# Patient Record
Sex: FEMALE | Race: BLACK OR AFRICAN AMERICAN | NOT HISPANIC OR LATINO | Employment: FULL TIME | URBAN - METROPOLITAN AREA
[De-identification: names, ages, dates, MRNs, and addresses within clinical notes are randomized per-mention and may not be internally consistent; named-entity substitution may affect disease eponyms.]

---

## 2019-07-17 ENCOUNTER — TELEPHONE (OUTPATIENT)
Dept: OBGYN CLINIC | Facility: HOSPITAL | Age: 59
End: 2019-07-17

## 2019-07-17 NOTE — TELEPHONE ENCOUNTER
Patient is calling to find out if we received the records that her last doctor was going to fax over  Patient is stating that they were given the fax number of [phone]  Please advise as the patient has an appt on Friday 7/19/19

## 2019-07-17 NOTE — TELEPHONE ENCOUNTER
Called patient to advise I have not seen anything in [address] or in her chart as of yet  I gave her our clinical fax number to have them re-fax to there

## 2019-07-19 ENCOUNTER — OFFICE VISIT (OUTPATIENT)
Dept: OBGYN CLINIC | Facility: CLINIC | Age: 59
End: 2019-07-19
Payer: COMMERCIAL

## 2019-07-19 ENCOUNTER — APPOINTMENT (OUTPATIENT)
Dept: RADIOLOGY | Facility: CLINIC | Age: 59
End: 2019-07-19
Payer: COMMERCIAL

## 2019-07-19 VITALS
HEIGHT: 61 IN | WEIGHT: 145 LBS | BODY MASS INDEX: 27.38 KG/M2 | DIASTOLIC BLOOD PRESSURE: 66 MMHG | SYSTOLIC BLOOD PRESSURE: 101 MMHG | HEART RATE: 91 BPM

## 2019-07-19 DIAGNOSIS — M25.511 RIGHT SHOULDER PAIN, UNSPECIFIED CHRONICITY: ICD-10-CM

## 2019-07-19 DIAGNOSIS — M75.01 ADHESIVE CAPSULITIS OF RIGHT SHOULDER: Primary | ICD-10-CM

## 2019-07-19 PROCEDURE — 73030 X-RAY EXAM OF SHOULDER: CPT

## 2019-07-19 PROCEDURE — 99203 OFFICE O/P NEW LOW 30 MIN: CPT | Performed by: ORTHOPAEDIC SURGERY

## 2019-07-19 PROCEDURE — 20610 DRAIN/INJ JOINT/BURSA W/O US: CPT | Performed by: ORTHOPAEDIC SURGERY

## 2019-07-19 RX ORDER — LIDOCAINE HYDROCHLORIDE 20 MG/ML
4 INJECTION, SOLUTION INFILTRATION; PERINEURAL
Status: COMPLETED | OUTPATIENT
Start: 2019-07-19 | End: 2019-07-19

## 2019-07-19 RX ORDER — IBUPROFEN 200 MG
1 CAPSULE ORAL DAILY
COMMUNITY
End: 2019-09-18

## 2019-07-19 RX ORDER — DEXAMETHASONE SODIUM PHOSPHATE 100 MG/10ML
40 INJECTION INTRAMUSCULAR; INTRAVENOUS
Status: COMPLETED | OUTPATIENT
Start: 2019-07-19 | End: 2019-07-19

## 2019-07-19 RX ORDER — MULTIVITAMIN
1 TABLET ORAL DAILY
COMMUNITY

## 2019-07-19 RX ORDER — LOSARTAN POTASSIUM AND HYDROCHLOROTHIAZIDE 25; 100 MG/1; MG/1
TABLET ORAL
COMMUNITY
Start: 2019-07-15

## 2019-07-19 RX ADMIN — DEXAMETHASONE SODIUM PHOSPHATE 40 MG: 100 INJECTION INTRAMUSCULAR; INTRAVENOUS at 15:58

## 2019-07-19 RX ADMIN — LIDOCAINE HYDROCHLORIDE 4 ML: 20 INJECTION, SOLUTION INFILTRATION; PERINEURAL at 15:58

## 2019-07-19 NOTE — PROGRESS NOTES
Assessment/Plan:  1  Right shoulder pain, unspecified chronicity  XR shoulder 2+ vw right    Ambulatory referral to Physical Therapy       Scribe Attestation    I,:   Stephany Arreguin MD am acting as a scribe while in the presence of the attending physician :        I,:   Pio Sánchez MD personally performed the services described in this documentation    as scribed in my presence :            58 y/o F with chronic shoulder pain, likely adhesive capsulitis  As steroid injections worked in the past, offered another steroid injection along with referral for physical therapy  Explained risks and benefits  Patient was agreeable to plan  Recommended to f/u in 6 weeks, if pain has persisted with above mentioned interventions will consider further work up such as MRI  Subjective:   Abelardo Leone is a 59 y o  female who presents to the office today for right shoulder pain  Patient brought in records from when she saw Dr Kristi Lozada for same problem  She was diagnosed with adhesive capsulitis and bicipital tendinitis  Received steroid injection in 6/29/17 and 1/23/18  Reports injections provided alleviation of pain for a few months until medication was wearing off  States pain is constant but severity of pain varies throughout the day, related to specific movements  Also reports she has pain in her shoulder while sleeping  Review of Systems   Eyes: Positive for pain, redness and visual disturbance  Musculoskeletal: Positive for myalgias  Skin: Positive for wound           Past Medical History:   Diagnosis Date    Hypertension        Past Surgical History:   Procedure Laterality Date    GASTRIC BYPASS  2010    SHOULDER SURGERY Left 2011       Family History   Problem Relation Age of Onset    Lung cancer Mother     Hypertension Mother     Hypertension Father        Social History     Occupational History    Not on file   Tobacco Use    Smoking status: Never Smoker    Smokeless tobacco: Never Used   Substance and Sexual Activity    Alcohol use: Yes     Comment: Socially    Drug use: Never    Sexual activity: Not on file         Current Outpatient Medications:     calcium citrate (CALCITRATE) 950 MG tablet, Take 1 tablet by mouth daily, Disp: , Rfl:     Cholecalciferol (VITAMIN D PO), Take by mouth, Disp: , Rfl:     losartan-hydrochlorothiazide (HYZAAR) 100-25 MG per tablet, , Disp: , Rfl:     Multiple Vitamin (MULTIVITAMIN) tablet, Take 1 tablet by mouth daily, Disp: , Rfl:     Allergies   Allergen Reactions    Penicillins        Objective:  Vitals:    07/19/19 1455   BP: 101/66   Pulse: 91       Right Shoulder Exam     Tenderness   The patient is experiencing no tenderness  Range of Motion   Extension: 10   External rotation: 30   Forward flexion: 90     Muscle Strength   Supraspinatus: 5/5   Subscapularis: 5/5   Biceps: 5/5     Other   Erythema: absent  Scars: absent  Sensation: normal    Comments:  Positive speed's and empty can test            Physical Exam   Constitutional: She is oriented to person, place, and time  She appears well-developed and well-nourished  No distress  HENT:   Head: Normocephalic and atraumatic  Eyes: Conjunctivae and EOM are normal    Neck: Normal range of motion  Cardiovascular: Intact distal pulses  Pulmonary/Chest: Effort normal    Musculoskeletal: She exhibits no edema or tenderness  As described in Ortho exam   Neurological: She is alert and oriented to person, place, and time  Vitals reviewed        I have personally reviewed pertinent films in PACS and my interpretation is as follows:  No degenerative changes or acute fractures

## 2019-07-19 NOTE — PROGRESS NOTES
Large joint arthrocentesis: R subcoracoid bursa  Date/Time: 7/19/2019 3:58 PM  Consent given by: patient  Site marked: site marked  Timeout: Immediately prior to procedure a time out was called to verify the correct patient, procedure, equipment, support staff and site/side marked as required   Supporting Documentation  Indications: pain   Procedure Details  Location: shoulder - R subcoracoid bursa  Preparation: Patient was prepped and draped in the usual sterile fashion  Needle size: 22 G  Ultrasound guidance: no  Approach: anterior  Medications administered: 4 mL lidocaine 2 %; 40 mg dexamethasone 100 mg/10 mL    Patient tolerance: patient tolerated the procedure well with no immediate complications

## 2019-07-29 ENCOUNTER — EVALUATION (OUTPATIENT)
Dept: PHYSICAL THERAPY | Facility: CLINIC | Age: 59
End: 2019-07-29
Payer: COMMERCIAL

## 2019-07-29 DIAGNOSIS — M75.01 ADHESIVE CAPSULITIS OF RIGHT SHOULDER: ICD-10-CM

## 2019-07-29 PROCEDURE — 97161 PT EVAL LOW COMPLEX 20 MIN: CPT | Performed by: PHYSICAL THERAPIST

## 2019-07-29 NOTE — PROGRESS NOTES
PT Evaluation     Today's date: 2019  Patient name: Carol Ba  : 1960  MRN: [id number]  Referring provider: Karen Hendrickson,*  Dx:   Encounter Diagnosis     ICD-10-CM    1  Adhesive capsulitis of right shoulder M75 01 Ambulatory referral to Physical Therapy                  Assessment  Assessment details: Jesusita Mendoza with signs and symptoms consistent with Adhesive capsulitis of right shoulder, with loss of range of motion, strength and spinal stabilization  Presents with high reactivity  Carol Ba would benefit with physical therapy to address these impairments to return to prior level of function  Impairments: abnormal or restricted ROM, activity intolerance, impaired physical strength, lacks appropriate home exercise program, pain with function and poor posture   Understanding of Dx/Px/POC: good   Prognosis: fair    Goals  STG  Initiate HEP  Able to sleep throughout night without pain in 3 weeks  LTG  Independent with HEP  Able to reach overhead without pain in 6 weeks  FOTO > 68 in 6 weeks    Plan  Planned therapy interventions: manual therapy, joint mobilization, neuromuscular re-education, patient education, postural training, strengthening, stretching, therapeutic exercise and home exercise program  Frequency: 2x week  Duration in visits: 12  Duration in weeks: 6  Treatment plan discussed with: patient        Subjective Evaluation    History of Present Illness  Mechanism of injury: Patient reports shoulder right shoulder pain that began a few months ago  Patient reports experiencing left frozen shoulder 8 years ago that required an ZARA  She denies any injury to the right shoulder  She had an injection one week ago by Dr Radha Talley to the right shoulder            Recurrent probem    Quality of life: good    Pain  Current pain ratin  At best pain ratin  At worst pain ratin  Location: Anterior right shoulder  Quality: dull ache  Relieving factors: change in position  Aggravating factors: overhead activity  Progression: worsening    Social Support  Steps to enter house: yes  Stairs in house: yes     Hand dominance: right    Treatments  Current treatment: physical therapy  Patient Goals  Patient goals for therapy: decreased pain, increased motion, increased strength and independence with ADLs/IADLs          Objective     Static Posture     Head  Forward  Shoulders  Rounded      Active Range of Motion   Left Shoulder   Flexion: 170 degrees   Abduction: 170 degrees   External rotation 0°: 80 degrees   Internal rotation 0°: 60 degrees     Right Shoulder   Flexion: 80 degrees with pain  Abduction: 70 degrees with pain  External rotation 0°: 15 degrees with pain  Internal rotation 0°: 30 degrees with pain    Strength/Myotome Testing     Left Shoulder     Planes of Motion   Flexion: 5   Abduction: 5   External rotation at 0°: 5   Internal rotation at 0°: 5     Right Shoulder     Planes of Motion   Flexion: 2   Abduction: 2   External rotation at 0°: 2   Internal rotation at 0°: 2       Flowsheet Rows      Most Recent Value   PT/OT G-Codes   Current Score  48   Projected Score  68   FOTO information reviewed  Yes   G code set  Carrying, Moving & Handling Objects             Precautions: HTN    Manual                                                                                   Exercise Diary                                                                                                                                                                                                                                                                                      Modalities

## 2019-08-06 ENCOUNTER — OFFICE VISIT (OUTPATIENT)
Dept: PHYSICAL THERAPY | Facility: CLINIC | Age: 59
End: 2019-08-06
Payer: COMMERCIAL

## 2019-08-06 DIAGNOSIS — M75.01 ADHESIVE CAPSULITIS OF RIGHT SHOULDER: Primary | ICD-10-CM

## 2019-08-06 PROCEDURE — 97110 THERAPEUTIC EXERCISES: CPT

## 2019-08-06 PROCEDURE — 97140 MANUAL THERAPY 1/> REGIONS: CPT

## 2019-08-06 NOTE — PROGRESS NOTES
Daily Note     Today's date: 2019  Patient name: Ronal Wallace  : 1960  MRN: [id number]  Referring provider: Tara Rios,*  Dx:   Encounter Diagnosis     ICD-10-CM    1  Adhesive capsulitis of right shoulder M75 01                   Subjective: My shoulder is sore & it feels very tired  Objective: See treatment diary below      Assessment: Tolerated treatment fair  Patient demonstrated fatigue post treatment   R shoulder PROM in supine flexion 90 degrees ; ER 45 degrees  (with end range pain)      Plan: Progress treatment as tolerated         Precautions: HTN    Manual              R shoulder flexion/ER PROM  As tolerated           R ST mobs  L S/L                                                      Exercise Diary    EVAL            UE FIS w/pb  [id number]           Pulleys   [id number]           TB rows   Red x20 reps           R IR/ER walk outs  Red x10 reps 5 sec hold           scap retractions supine  10x 5sec hold                                                                                                                                                                                                                  Modalities              CP R shoulder supine  5min

## 2019-08-12 ENCOUNTER — OFFICE VISIT (OUTPATIENT)
Dept: PHYSICAL THERAPY | Facility: CLINIC | Age: 59
End: 2019-08-12
Payer: COMMERCIAL

## 2019-08-12 DIAGNOSIS — M75.01 ADHESIVE CAPSULITIS OF RIGHT SHOULDER: Primary | ICD-10-CM

## 2019-08-12 PROCEDURE — 97110 THERAPEUTIC EXERCISES: CPT | Performed by: PHYSICAL THERAPIST

## 2019-08-12 PROCEDURE — 97140 MANUAL THERAPY 1/> REGIONS: CPT | Performed by: PHYSICAL THERAPIST

## 2019-08-12 PROCEDURE — 97112 NEUROMUSCULAR REEDUCATION: CPT | Performed by: PHYSICAL THERAPIST

## 2019-08-12 NOTE — PROGRESS NOTES
Daily Note     Today's date: 2019  Patient name: Mare Alberto  : 1960  MRN: [id number]  Referring provider: Kiko Xiong,*  Dx:   Encounter Diagnosis     ICD-10-CM    1  Adhesive capsulitis of right shoulder M75 01                   Subjective: I can move alittle farther  Objective: See treatment diary below      Assessment: Tolerated treatment well  Patient demonstrated fatigue post treatment and exhibited good technique with therapeutic exercises  Pre-tx ROM Flex 0-100, post-Tx Rom flex 0-120  Plan: Continue per plan of care  Precautions: HTN    Manual             R shoulder flexion/ER PROM  As tolerated perf          R ST mobs  L S/L           Jt mob inf glide neutral   Gr4 10x          Jt mob lat distract   Gr 4 10x                           Exercise Diary    EVAL           UE FIS w/pb  [id number]           Pulleys   [id number] 10x10          TB rows   Red x20 reps           R IR/ER walk outs  Red x10 reps 5 sec hold           scap retractions supine  10x 5sec hold redcord  supine          PNF Sh clock   perf            Punchouts in supine   20x          Wall slides   20x          Self ADD stretch   10x          Nustep    10 min L1                                                                                                                                                Modalities              CP R shoulder supine  5min

## 2019-08-13 ENCOUNTER — APPOINTMENT (OUTPATIENT)
Dept: PHYSICAL THERAPY | Facility: CLINIC | Age: 59
End: 2019-08-13
Payer: COMMERCIAL

## 2019-08-14 ENCOUNTER — OFFICE VISIT (OUTPATIENT)
Dept: PHYSICAL THERAPY | Facility: CLINIC | Age: 59
End: 2019-08-14
Payer: COMMERCIAL

## 2019-08-14 DIAGNOSIS — M75.01 ADHESIVE CAPSULITIS OF RIGHT SHOULDER: Primary | ICD-10-CM

## 2019-08-14 PROCEDURE — 97110 THERAPEUTIC EXERCISES: CPT

## 2019-08-14 PROCEDURE — 97112 NEUROMUSCULAR REEDUCATION: CPT

## 2019-08-14 NOTE — PROGRESS NOTES
Daily Note      Today's date: 2019  Patient name: Ramirez Chi  : 1960  MRN: [id number]  Referring provider: Lea Maxwell,*  Dx:   Encounter Diagnosis     ICD-10-CM    1  Adhesive capsulitis of right shoulder M75 01        Subjective: Pt reports "It hurts today"  Pt reports overall improvement  Objective: See treatment diary below    Assessment: Pt tolerated treatment well  Pt would benefit from continued PT  Pt demo 140 degrees of shld flex this session  Plan: Continue per plan of care  Precautions: HTN    Manual       R shoulder flexion/ER PROM  As tolerated perf Performed  All directions    R ST mobs  L S/L      Jt mob inf glide neutral   Gr4 10x     Jt mob lat distract   Gr 4 10x                 Exercise Diary    EVAL     Nustep   10 min L1 10'  L1            UE FIS w/pb  [id number]  20x5s    Pulleys   [id number] 10x10 10x10s    TB rows   Red x20 reps  20x  red    R IR/ER walk outs  Red x10 reps 5 sec hold  10x (5s)    scap retractions supine  10x 5sec hold redcord  supine     PNF Sh clock   perf       Punchouts in supine   20x --    Wall slides   20x 20x    Self ADD stretch   10x --    IR str     With pillow case 10x5s hold    OH shld flex str    With cane 20x                                                                                Modalities       CP R shoulder supine  5min

## 2019-08-19 ENCOUNTER — OFFICE VISIT (OUTPATIENT)
Dept: PHYSICAL THERAPY | Facility: CLINIC | Age: 59
End: 2019-08-19
Payer: COMMERCIAL

## 2019-08-19 DIAGNOSIS — M75.01 ADHESIVE CAPSULITIS OF RIGHT SHOULDER: Primary | ICD-10-CM

## 2019-08-19 PROCEDURE — 97140 MANUAL THERAPY 1/> REGIONS: CPT | Performed by: PHYSICAL THERAPIST

## 2019-08-19 PROCEDURE — 97110 THERAPEUTIC EXERCISES: CPT | Performed by: PHYSICAL THERAPIST

## 2019-08-19 NOTE — PROGRESS NOTES
Daily Note     Today's date: 2019  Patient name: Honey Mathew  : 1960  MRN: [id number]  Referring provider: Mardella Cogan,*  Dx:   Encounter Diagnosis     ICD-10-CM    1  Adhesive capsulitis of right shoulder M75 01                   Subjective: My shoulder is still pianful        Objective: See treatment diary below      Assessment: Tolerated treatment well  Patient demonstrated fatigue post treatment and exhibited good technique with therapeutic exercises      Plan: Continue per plan of care  Precautions: HTN    Manual            R shoulder flexion/ER PROM  As tolerated perf perf         R ST mobs  L S/L           Jt mob inf glide neutral   Gr4 10x Gr4 10x         Jt mob lat distract   Gr 4 10x Gr4 10x                          Exercise Diary    EVAL          UE FIS w/pb  [id number]  20x         Pulleys   [id number] 10x10 20x         TB rows   Red x20 reps  Gr 20x         R IR/ER walk outs  Red x10 reps 5 sec hold           scap retractions supine  10x 5sec hold redcord  supine          PNF Sh clock   perf            Punchouts in supine   20x          Wall slides   20x          Self ADD stretch   10x          Nustep    10 min L1          Neurac sh flex in kneeling    2x5         Neurac scap retract w depression ist    2x5         Cane ER, EXT, Flex    3x10                                                                                                        Modalities              CP R shoulder supine  5min

## 2019-08-21 ENCOUNTER — OFFICE VISIT (OUTPATIENT)
Dept: PHYSICAL THERAPY | Facility: CLINIC | Age: 59
End: 2019-08-21
Payer: COMMERCIAL

## 2019-08-21 DIAGNOSIS — M75.01 ADHESIVE CAPSULITIS OF RIGHT SHOULDER: Primary | ICD-10-CM

## 2019-08-21 PROCEDURE — 97112 NEUROMUSCULAR REEDUCATION: CPT | Performed by: PHYSICAL THERAPIST

## 2019-08-21 PROCEDURE — 97110 THERAPEUTIC EXERCISES: CPT | Performed by: PHYSICAL THERAPIST

## 2019-08-21 NOTE — PROGRESS NOTES
Daily Note     Today's date: 2019  Patient name: Xin Reilly  : 1960  MRN: [id number]  Referring provider: Aldo Fuentes,*  Dx:   Encounter Diagnosis     ICD-10-CM    1  Adhesive capsulitis of right shoulder M75 01                   Subjective: There is no improvement of pain, but I have better ROM  Objective: See treatment diary below      Assessment: Tolerated treatment well  Patient demonstrated fatigue post treatment and exhibited good technique with therapeutic exercises  Pre-tx  ABD 80 degrees,  Post-tx ABD 95 degrees  Plan: Continue per plan of care  Precautions: HTN    Manual            R shoulder flexion/ER PROM  As tolerated perf perf         R ST mobs  L S/L           Jt mob inf glide neutral   Gr4 10x Gr4 10x         Jt mob lat distract   Gr 4 10x Gr4 10x                          Exercise Diary    EVAL         UE FIS w/pb  [id number]  20x 20x        Pulleys   [id number] 10x10 20x 20x        TB rows   Red x20 reps  Gr 20x         R IR/ER walk outs  Red x10 reps 5 sec hold           scap retractions supine  10x 5sec hold redcord  supine          PNF Sh clock   perf            Punchouts in supine   20x          Wall slides   20x          Self ADD stretch   10x          Nustep    10 min L1          Neurac sh flex in kneeling    2x5 2x5        Neurac scap retract w depression ist    2x5 2x5        Cane ER, EXT, Flex    3x10         Neurac supine pelvic lift     2x5        Neurac supine bridge     2x5        Neurac SDLY Hip ABD     2x5        Neurac SDLY Hip ADD     2x5        Neurac scapular retract supine     2x5        Neurac sh ER supine     2x5                         Modalities              CP R shoulder supine  5min

## 2019-08-26 ENCOUNTER — OFFICE VISIT (OUTPATIENT)
Dept: PHYSICAL THERAPY | Facility: CLINIC | Age: 59
End: 2019-08-26
Payer: COMMERCIAL

## 2019-08-26 DIAGNOSIS — M75.01 ADHESIVE CAPSULITIS OF RIGHT SHOULDER: Primary | ICD-10-CM

## 2019-08-26 PROCEDURE — 97110 THERAPEUTIC EXERCISES: CPT

## 2019-08-26 PROCEDURE — 97140 MANUAL THERAPY 1/> REGIONS: CPT

## 2019-08-26 NOTE — PROGRESS NOTES
Daily Note     Today's date: 2019  Patient name: Becky Verdugo  : 1960  MRN: [id number]  Referring provider: Tonio Stoddard,*  Dx:   Encounter Diagnosis     ICD-10-CM    1  Adhesive capsulitis of right shoulder M75 01                   Subjective: Patient reports 8/10 R shoulder pain  Objective: See treatment diary below      Assessment: Tolerated treatment fair  Patient would benefit from continued PT   R shoulder AAROM (supine) flexion 120 degrees; ER 45 degrees ( +end range pain)      Plan: Progress treatment as tolerated  Precautions: HTN    Manual           R shoulder flexion/ER PROM  As tolerated perf perf performed        R ST mobs  L S/L   performed        Jt mob inf glide neutral   Gr4 10x Gr4 10x ----        Jt mob lat distract   Gr 4 10x Gr4 10x performed                         Exercise Diary    EVAL        UE FIS w/pb  [id number]  20x 20x 20x       Pulleys   [id number] 10x10 20x 20x [id number]       TB rows   Red x20 reps  Gr 20x  Gr x20       R IR/ER walk outs  Red x10 reps 5 sec hold    Red x10        scap retractions supine  10x 5sec hold redcord  supine   supine 2x10       PNF Sh clock   perf            Punchouts in supine   20x          Wall slides   20x   hep       Self ADD stretch   10x   hep       Nustep    10 min L1   ---       Neurac sh flex in kneeling    2x5 2x5        Neurac scap retract w depression ist    2x5 2x5        Cane ER, EXT, Flex    3x10  ER/flexion x10 reps        Neurac supine pelvic lift     2x5        Neurac supine bridge     2x5        Neurac SDLY Hip ABD     2x5        Neurac SDLY Hip ADD     2x5        Neurac scapular retract supine     2x5        Neurac sh ER supine     2x5                         Modalities              CP R shoulder supine  5min

## 2019-08-28 ENCOUNTER — OFFICE VISIT (OUTPATIENT)
Dept: PHYSICAL THERAPY | Facility: CLINIC | Age: 59
End: 2019-08-28
Payer: COMMERCIAL

## 2019-08-28 DIAGNOSIS — M75.01 ADHESIVE CAPSULITIS OF RIGHT SHOULDER: Primary | ICD-10-CM

## 2019-08-28 PROCEDURE — 97140 MANUAL THERAPY 1/> REGIONS: CPT

## 2019-08-28 PROCEDURE — 97112 NEUROMUSCULAR REEDUCATION: CPT

## 2019-08-28 NOTE — PROGRESS NOTES
PT Re-Evaluation     Today's date: 2019  Patient name: Honey Mathew  : 1960  MRN: [id number]  Referring provider: Mardella Cogan,*  Dx:   Encounter Diagnosis     ICD-10-CM    1  Adhesive capsulitis of right shoulder M75 01                   Assessment  Assessment details: Honey Mathew has been seen in skilled outpatient physical therapy for 8 sessions and has shown limited progress toward short term and long term goals  Pt presents with improved postural awareness and increased A/PROM of R shoulder  Pt presents with complaints of increased pain intensity and has shown limited gains in strength  Pt continues to present with the following impairments: pain, decreased UE range of motion, impaired function and fair posture  Due to these impairments, pt continues to have difficulty performing the following activities without pain: ADL's, recreational activities, lifting/carrying, reaching overhead, putting on/taking off shirt/sweater, reaching behind the back, turning/twisting arms, household chores, yard work  Pt will benefit from continued skilled physical therapy in order to address the aforementioned deficits and functional limitations and to progress toward prior level of function       Understanding of Dx/Px/POC: good   Prognosis: fair    Goals  STG  [Goal Met] Initiate HEP  [20% Met] Able to sleep throughout night without pain in 3 weeks  LTG  [Goal Met] Independent with HEP  [25% Met] Able to reach overhead without pain in 6 weeks  [0% Met] FOTO > 68 in 6 weeks    Plan  Planned therapy interventions: manual therapy, joint mobilization, neuromuscular re-education, patient education, postural training, strengthening, stretching, therapeutic exercise and home exercise program  Frequency: 2x week  Duration in visits: 12  Duration in weeks: 6  Treatment plan discussed with: patient        Subjective Evaluation    History of Present Illness  Mechanism of injury: Patient reports shoulder right shoulder pain that began a few months ago  Patient reports experiencing left frozen shoulder 8 years ago that required an ZARA  She denies any injury to the right shoulder  She had an injection one week ago by Dr Adrian Panda to the right shoulder     (19) Pt reports limited progress since starting skilled PT  Pt states that range of motion has improved, but pain has increased since starting PT  Pt states that the pain sometimes intensifies at rest  Pt also states that strength is likely the same or slightly worse  Pt states that reaching overhead/behind the back, lifting/carrying, self-care, putting on/taking off shirt/jacket and household chores are still difficult  Pt states she will follow up with Dr Adrian Panda on Friday,   Recurrent probem    Quality of life: good    Pain  Current pain ratin  At best pain ratin  At worst pain rating: 10  Location: Anterior right shoulder  Quality: dull ache  Relieving factors: change in position  Aggravating factors: overhead activity  Progression: worsening    Social Support  Steps to enter house: yes  Stairs in house: yes     Hand dominance: right    Treatments  Current treatment: physical therapy  Patient Goals  Patient goals for therapy: decreased pain, increased motion, increased strength and independence with ADLs/IADLs        Objective     Static Posture     Head  Forward  Shoulders  Rounded      Active Range of Motion   Left Shoulder   Flexion: 170 degrees   Abduction: 170 degrees   External rotation 0°: 80 degrees   Internal rotation 0°: 60 degrees     Right Shoulder   Flexion: 120 degrees with pain  Abduction: 90 degrees with pain  External rotation 0°: 45 degrees with pain  Internal rotation 0°: 50 degrees with pain    Passive Range of Motion  Right Shoulder  Flexion: 155 degrees with pain  Abduction: 95 degrees with empty end feel due to pain  External rotation 0°: 55 degrees with empty end feel due to pain   Internal rotation 0°: 55 degrees with pain    Strength/Myotome Testing     Left Shoulder     Planes of Motion   Flexion: 5   Abduction: 5   External rotation at 0°: 5   Internal rotation at 0°: 5     Right Shoulder     Planes of Motion   Flexion: 2+  Abduction: 2   External rotation at 0°: 2   Internal rotation at 0°: 2            Precautions: HTN    Manual  8/26 8/28      R shoulder flexion/ER PROM performed Performed      R ST mobs performed Performed      Jt mob inf glide neutral ---- --      Jt mob lat distract performed Performed                  Exercise Diary  8/26 8/28      UE FIS w/pb 20x 20x      Pulleys  [id number] 15x (5s)      TB rows  Gr x20 20x  green      R IR/ER walk outs Red x10  --      scap retractions supine supine 2x10 2x10      PNF Sh clock        Punchouts in supine        Wall slides hep       Self ADD stretch hep       Nustep  ---       Neurac sh flex in kneeling        Neurac scap retract w depression ist        Cane ER, EXT, Flex ER/flexion x10 reps  10x  Flex/ER      Neurac supine pelvic lift        Neurac supine bridge        Neurac SDLY Hip ABD        Neurac SDLY Hip ADD        Neurac scapular retract supine        Neurac sh ER supine                    Modalities         CP R shoulder supine

## 2019-08-30 ENCOUNTER — OFFICE VISIT (OUTPATIENT)
Dept: OBGYN CLINIC | Facility: CLINIC | Age: 59
End: 2019-08-30
Payer: COMMERCIAL

## 2019-08-30 VITALS
DIASTOLIC BLOOD PRESSURE: 84 MMHG | SYSTOLIC BLOOD PRESSURE: 126 MMHG | HEIGHT: 61 IN | HEART RATE: 88 BPM | WEIGHT: 135.4 LBS | BODY MASS INDEX: 25.57 KG/M2

## 2019-08-30 DIAGNOSIS — M75.01 ADHESIVE CAPSULITIS OF RIGHT SHOULDER: Primary | ICD-10-CM

## 2019-08-30 PROCEDURE — 99213 OFFICE O/P EST LOW 20 MIN: CPT | Performed by: ORTHOPAEDIC SURGERY

## 2019-08-30 NOTE — PROGRESS NOTES
Assessment/Plan:  1  Adhesive capsulitis of right shoulder  MRI shoulder right wo contrast       Scribe Attestation    I,:   Chu Rivera MA am acting as a scribe while in the presence of the attending physician :        I,:   Jennie Calzada MD personally performed the services described in this documentation    as scribed in my presence :            Winthrop Hatchet and I engaged in a discussion today in the office in regard to her shoulder pain  Based on her clinical exam today it is clear that she continues to suffer from adhesive capsulitis of her right shoulder  Because she continues to have limited range of motion along with significant pain I will order an MRI to further evaluate her rotator cuff  I am suspicious for rotator cuff tear  I would like to see her back in office after testing to further discuss her treatment options  Subjective:   Marychuy Peña is a 59 y o  female who presents to the office today for a follow-up evaluation of her right shoulder  Patient was previously referred to physical therapy to address her adhesive capsulitis  She states she has been compliant in attending physical therapy and has been slowly improving with her range of motion  Patient states she has had a large increase in her pain  Patient notes no new incident of injury  Patient describes the pain as constant that radiates up to her neck and to the distal deltoid area  Pain at times wakes her from sleep  She states she still has trouble reaching behind her and often has to use the left arm to dress herself  She denies any numbness tingling today  Review of Systems   Constitutional: Negative for chills, fever and unexpected weight change  HENT: Negative for hearing loss, nosebleeds and sore throat  Eyes: Negative for pain, redness and visual disturbance  Respiratory: Negative for cough, shortness of breath and wheezing  Cardiovascular: Negative for chest pain, palpitations and leg swelling  Gastrointestinal: Negative for abdominal pain, nausea and vomiting  Endocrine: Negative for polydipsia and polyuria  Genitourinary: Negative for dyspareunia and hematuria  Musculoskeletal: Positive for arthralgias and myalgias  Negative for joint swelling  Skin: Negative for rash and wound  Neurological: Negative for dizziness, numbness and headaches  Psychiatric/Behavioral: Negative for decreased concentration and suicidal ideas  The patient is not nervous/anxious  Past Medical History:   Diagnosis Date    Hypertension        Past Surgical History:   Procedure Laterality Date    GASTRIC BYPASS  2010    SHOULDER SURGERY Left 2011       Family History   Problem Relation Age of Onset    Lung cancer Mother     Hypertension Mother     Hypertension Father        Social History     Occupational History    Not on file   Tobacco Use    Smoking status: Never Smoker    Smokeless tobacco: Never Used   Substance and Sexual Activity    Alcohol use: Yes     Comment: Socially    Drug use: Never    Sexual activity: Not on file         Current Outpatient Medications:     calcium citrate (CALCITRATE) 950 MG tablet, Take 1 tablet by mouth daily, Disp: , Rfl:     Cholecalciferol (VITAMIN D PO), Take by mouth, Disp: , Rfl:     losartan-hydrochlorothiazide (HYZAAR) 100-25 MG per tablet, , Disp: , Rfl:     Multiple Vitamin (MULTIVITAMIN) tablet, Take 1 tablet by mouth daily, Disp: , Rfl:     Allergies   Allergen Reactions    Penicillins        Objective:  Vitals:    08/30/19 1507   BP: 126/84   Pulse: 88       Right Shoulder Exam     Range of Motion   External rotation: 20   Right shoulder internal rotation 0 degrees: 0  Muscle Strength   Abduction: 4/5   Internal rotation: 5/5   External rotation: 5/5     Other   Erythema: absent  Scars: absent  Sensation: normal  Pulse: present            Physical Exam   Constitutional: She is oriented to person, place, and time   She appears well-developed and well-nourished  HENT:   Head: Normocephalic and atraumatic  Eyes: Pupils are equal, round, and reactive to light  Conjunctivae are normal    Neck: Normal range of motion  Neck supple  Cardiovascular: Normal rate and intact distal pulses  Pulmonary/Chest: Effort normal  No respiratory distress  Musculoskeletal:   As noted in HPI   Neurological: She is alert and oriented to person, place, and time  Skin: Skin is warm and dry  Psychiatric: She has a normal mood and affect  Her behavior is normal    Vitals reviewed  I have personally reviewed pertinent films in PACS and my interpretation is as follows:   No new images to review

## 2019-09-11 ENCOUNTER — OFFICE VISIT (OUTPATIENT)
Dept: OBGYN CLINIC | Facility: CLINIC | Age: 59
End: 2019-09-11
Payer: COMMERCIAL

## 2019-09-11 VITALS
WEIGHT: 135.6 LBS | BODY MASS INDEX: 25.6 KG/M2 | HEART RATE: 67 BPM | HEIGHT: 61 IN | DIASTOLIC BLOOD PRESSURE: 64 MMHG | SYSTOLIC BLOOD PRESSURE: 99 MMHG

## 2019-09-11 DIAGNOSIS — M75.01 ADHESIVE CAPSULITIS OF RIGHT SHOULDER: Primary | ICD-10-CM

## 2019-09-11 PROCEDURE — 99214 OFFICE O/P EST MOD 30 MIN: CPT | Performed by: ORTHOPAEDIC SURGERY

## 2019-09-11 NOTE — PROGRESS NOTES
Assessment/Plan:  1  Adhesive capsulitis of right shoulder         Scribe Attestation    I,:   Tia Garrido am acting as a scribe while in the presence of the attending physician :        I,:   Simba Seymour MD personally performed the services described in this documentation    as scribed in my presence :            Thalia upon examination and review the MRI report of the right shoulder does demonstrate symptoms consistent with adhesive capsulitis  The MRI report does note a very small interstitial tear of the infraspinatus however no tears to the supraspinatus  There is thickening of the joint capsule demonstrated into the axillary recess with surrounding edema, and fusion  She does demonstrate significant restrictions in range of motion actively as well as passively  She is unable to internally or externally rotate her shoulder passively on exam today  And demonstrates approximately 50° of passive abduction  She is weak into abduction due to pain however demonstrates good strength with internal and external rotation  I did note to [facility] that based off her clinical examination today as well as the MRI report the right shoulder she would be a candidate for right shoulder manipulation under anesthesia  I do not recommend a course of oral medications as she is unable to take NSAIDs and do not recommend corticosteroids as I do have concern for gastric and renal damage  I did discuss the procedure and associated risks including but not limited to bleeding, fracture, recurrence of painful symptoms, increased stiffness, increased pain, soft tissue injury, and need for further procedures  I did note to her that she may return to work and driving as she removed gains function into her arm after her nerve block  [facility] did verbalize understanding to the aforementioned information had no further questions    She will meet my surgical scheduler prior to leaving the office today set up a date and time as per my schedule  I will see Winthrop Hatchet back on the date of her procedure  Subjective:   Marychuy Peña is a 59 y o  female who presents to the office today for follow-up evaluation of her right shoulder  We have been treating her conservatively with physical therapy for adhesive capsulitis  Unfortunately she failed to have any significant improvements with range of motion or pain and still continues to have significant restrictions range of motion  She states that her painful symptoms are described as a moderate to severe sharp pain about the superior anterior aspect of her shoulder  She states it is worse with reaching activities and is better while at rest   She states that she will experience intermittent painful symptoms while at rest as well  She denies experiencing any numbness or tingling sensations  She did have an MRI of her right shoulder completed today unfortunately was unable to bring the images  However we do have access to the report  Review of Systems   Constitutional: Negative for chills, fever and unexpected weight change  HENT: Negative for hearing loss, nosebleeds and sore throat  Eyes: Negative for pain, redness and visual disturbance  Respiratory: Negative for cough, shortness of breath and wheezing  Cardiovascular: Negative for chest pain, palpitations and leg swelling  Gastrointestinal: Negative for abdominal pain, nausea and vomiting  Endocrine: Negative for polydipsia and polyuria  Genitourinary: Negative for dysuria and hematuria  Musculoskeletal:        See HPI   Skin: Negative for rash and wound  Neurological: Negative for dizziness, numbness and headaches  Psychiatric/Behavioral: Negative for decreased concentration and suicidal ideas  The patient is not nervous/anxious            Past Medical History:   Diagnosis Date    Hypertension        Past Surgical History:   Procedure Laterality Date    GASTRIC BYPASS  2010    SHOULDER SURGERY Left 2011 Family History   Problem Relation Age of Onset    Lung cancer Mother     Hypertension Mother     Hypertension Father        Social History     Occupational History    Not on file   Tobacco Use    Smoking status: Never Smoker    Smokeless tobacco: Never Used   Substance and Sexual Activity    Alcohol use: Yes     Comment: Socially    Drug use: Never    Sexual activity: Not on file         Current Outpatient Medications:     calcium citrate (CALCITRATE) 950 MG tablet, Take 1 tablet by mouth daily, Disp: , Rfl:     Cholecalciferol (VITAMIN D PO), Take by mouth, Disp: , Rfl:     losartan-hydrochlorothiazide (HYZAAR) 100-25 MG per tablet, , Disp: , Rfl:     Multiple Vitamin (MULTIVITAMIN) tablet, Take 1 tablet by mouth daily, Disp: , Rfl:     Allergies   Allergen Reactions    Penicillins        Objective:  Vitals:    09/11/19 1454   BP: 99/64   Pulse: 67       Right Shoulder Exam     Tenderness   The patient is experiencing no tenderness  Range of Motion   Active abduction: 40   Passive abduction: 50   External rotation: 0   Internal rotation 90 degrees: 0     Muscle Strength   Abduction: 3/5   Internal rotation: 5/5   External rotation: 5/5     Other   Erythema: absent  Scars: absent  Sensation: normal  Pulse: present            Physical Exam   Constitutional: She is oriented to person, place, and time  She appears well-developed and well-nourished  HENT:   Head: Normocephalic and atraumatic  Eyes: Conjunctivae are normal  Right eye exhibits no discharge  Left eye exhibits no discharge  Neck: Normal range of motion  Neck supple  Cardiovascular: Normal rate, normal heart sounds and intact distal pulses  Pulmonary/Chest: Effort normal and breath sounds normal  No respiratory distress  Neurological: She is alert and oriented to person, place, and time  Skin: Skin is warm and dry  Psychiatric: She has a normal mood and affect   Her behavior is normal  Judgment and thought content normal    Vitals reviewed  I have personally reviewed pertinent reports in PACS as the images were not available for review and my interpretation is as follows:    MRI report of the right shoulder demonstrates a small interstitial tear of the infraspinatus tendon  There is tendinosis demonstrated at the supraspinatus  There are no tears reported at the teres minor or subscapularis tendons  There is moderate thinking of the shoulder capsule into the axillary recess  There is a small joint effusion also reported

## 2019-09-11 NOTE — H&P (VIEW-ONLY)
Assessment/Plan:  1  Adhesive capsulitis of right shoulder         Scribe Attestation    I,:   Sj Perez am acting as a scribe while in the presence of the attending physician :        I,:   Kimberli العراقي MD personally performed the services described in this documentation    as scribed in my presence :            Thalia upon examination and review the MRI report of the right shoulder does demonstrate symptoms consistent with adhesive capsulitis  The MRI report does note a very small interstitial tear of the infraspinatus however no tears to the supraspinatus  There is thickening of the joint capsule demonstrated into the axillary recess with surrounding edema, and fusion  She does demonstrate significant restrictions in range of motion actively as well as passively  She is unable to internally or externally rotate her shoulder passively on exam today  And demonstrates approximately 50° of passive abduction  She is weak into abduction due to pain however demonstrates good strength with internal and external rotation  I did note to Estevan Cope that based off her clinical examination today as well as the MRI report the right shoulder she would be a candidate for right shoulder manipulation under anesthesia  I do not recommend a course of oral medications as she is unable to take NSAIDs and do not recommend corticosteroids as I do have concern for gastric and renal damage  I did discuss the procedure and associated risks including but not limited to bleeding, fracture, recurrence of painful symptoms, increased stiffness, increased pain, soft tissue injury, and need for further procedures  I did note to her that she may return to work and driving as she removed gains function into her arm after her nerve block  Estevan Cope did verbalize understanding to the aforementioned information had no further questions    She will meet my surgical scheduler prior to leaving the office today set up a date and time as per my schedule  I will see Sylvia Richards back on the date of her procedure  Subjective:   Kee Pablo is a 59 y o  female who presents to the office today for follow-up evaluation of her right shoulder  We have been treating her conservatively with physical therapy for adhesive capsulitis  Unfortunately she failed to have any significant improvements with range of motion or pain and still continues to have significant restrictions range of motion  She states that her painful symptoms are described as a moderate to severe sharp pain about the superior anterior aspect of her shoulder  She states it is worse with reaching activities and is better while at rest   She states that she will experience intermittent painful symptoms while at rest as well  She denies experiencing any numbness or tingling sensations  She did have an MRI of her right shoulder completed today unfortunately was unable to bring the images  However we do have access to the report  Review of Systems   Constitutional: Negative for chills, fever and unexpected weight change  HENT: Negative for hearing loss, nosebleeds and sore throat  Eyes: Negative for pain, redness and visual disturbance  Respiratory: Negative for cough, shortness of breath and wheezing  Cardiovascular: Negative for chest pain, palpitations and leg swelling  Gastrointestinal: Negative for abdominal pain, nausea and vomiting  Endocrine: Negative for polydipsia and polyuria  Genitourinary: Negative for dysuria and hematuria  Musculoskeletal:        See HPI   Skin: Negative for rash and wound  Neurological: Negative for dizziness, numbness and headaches  Psychiatric/Behavioral: Negative for decreased concentration and suicidal ideas  The patient is not nervous/anxious            Past Medical History:   Diagnosis Date    Hypertension        Past Surgical History:   Procedure Laterality Date    GASTRIC BYPASS  2010    SHOULDER SURGERY Left 2011 Family History   Problem Relation Age of Onset    Lung cancer Mother     Hypertension Mother     Hypertension Father        Social History     Occupational History    Not on file   Tobacco Use    Smoking status: Never Smoker    Smokeless tobacco: Never Used   Substance and Sexual Activity    Alcohol use: Yes     Comment: Socially    Drug use: Never    Sexual activity: Not on file         Current Outpatient Medications:     calcium citrate (CALCITRATE) 950 MG tablet, Take 1 tablet by mouth daily, Disp: , Rfl:     Cholecalciferol (VITAMIN D PO), Take by mouth, Disp: , Rfl:     losartan-hydrochlorothiazide (HYZAAR) 100-25 MG per tablet, , Disp: , Rfl:     Multiple Vitamin (MULTIVITAMIN) tablet, Take 1 tablet by mouth daily, Disp: , Rfl:     Allergies   Allergen Reactions    Penicillins        Objective:  Vitals:    09/11/19 1454   BP: 99/64   Pulse: 67       Right Shoulder Exam     Tenderness   The patient is experiencing no tenderness  Range of Motion   Active abduction: 40   Passive abduction: 50   External rotation: 0   Internal rotation 90 degrees: 0     Muscle Strength   Abduction: 3/5   Internal rotation: 5/5   External rotation: 5/5     Other   Erythema: absent  Scars: absent  Sensation: normal  Pulse: present            Physical Exam   Constitutional: She is oriented to person, place, and time  She appears well-developed and well-nourished  HENT:   Head: Normocephalic and atraumatic  Eyes: Conjunctivae are normal  Right eye exhibits no discharge  Left eye exhibits no discharge  Neck: Normal range of motion  Neck supple  Cardiovascular: Normal rate, normal heart sounds and intact distal pulses  Pulmonary/Chest: Effort normal and breath sounds normal  No respiratory distress  Neurological: She is alert and oriented to person, place, and time  Skin: Skin is warm and dry  Psychiatric: She has a normal mood and affect   Her behavior is normal  Judgment and thought content normal    Vitals reviewed  I have personally reviewed pertinent reports in PACS as the images were not available for review and my interpretation is as follows:    MRI report of the right shoulder demonstrates a small interstitial tear of the infraspinatus tendon  There is tendinosis demonstrated at the supraspinatus  There are no tears reported at the teres minor or subscapularis tendons  There is moderate thinking of the shoulder capsule into the axillary recess  There is a small joint effusion also reported

## 2019-09-12 ENCOUNTER — TELEPHONE (OUTPATIENT)
Dept: OBGYN CLINIC | Facility: CLINIC | Age: 59
End: 2019-09-12

## 2019-09-12 NOTE — TELEPHONE ENCOUNTER
Dr Lovell Bence is requesting a pain prescription for her shoulder sent to [facility]  Any questions or once ordered please call her [phone]    Thank you

## 2019-09-12 NOTE — TELEPHONE ENCOUNTER
Please advise patient that as per Dr Yash Gordon we do not prescribe pain mediatation pre operatively, she may take OTC pain reliever as needed

## 2019-09-18 NOTE — PRE-PROCEDURE INSTRUCTIONS
Pre-Surgery Instructions:   Medication Instructions    Calcium-Magnesium-Vitamin D (CALCIUM MAGNESIUM PO) Instructed patient per Anesthesia Guidelines   Cholecalciferol (VITAMIN D PO) Instructed patient per Anesthesia Guidelines   losartan-hydrochlorothiazide (HYZAAR) 100-25 MG per tablet Instructed patient per Anesthesia Guidelines   Multiple Vitamin (MULTIVITAMIN) tablet Instructed patient per Anesthesia Guidelines  Pre op instructions given   [facility] [phone] Surgical Experience    The following information was developed to assist you to prepare for your operation  What do I need to do before coming to the hospital?   Arrange for a responsible person to drive you to and from the hospital    Arrange care for your children at home  Children are not allowed in the recovery areas of the hospital   Plan to wear clothing that is easy to put on and take off  If you are having shoulder surgery, wear a shirt that buttons or zippers in the front  Bathing  o Shower the evening before and the morning of your surgery with an antibacterial soap  Please refer to the Pre Op Showering Instructions for Surgery Patients Sheet   o Remove nail polish and all body piercing jewelry  o Do not shave any body part for at least 24 hours before surgery-this includes face, arms, legs and upper body  Food  o Nothing to eat or drink after midnight the night before your surgery   This includes candy and chewing gum  o Exception: If your surgery is after 12:00pm (noon), you may have clear liquids such as 7-Up®, ginger ale, apple or cranberry juice, Jell-O®, water, or clear broth until 8:00 am  o Do not drink milk or juice with pulp on the morning before surgery  o Do not drink alcohol 24 hours before surgery  Medicine  o Follow instructions you received from your surgeon about which medicines you may take on the day of surgery  o If instructed to take medicine on the morning of surgery, take pills with just a small sip of water  Call your prescribing doctor for specific infroamtion on what to do if you take insulin    What should I bring to the hospital?    Bring:  Rexine Line or a walker, if you have them, for foot or knee surgery   A list of the daily medicines, vitamins, minerals, herbals and nutritional supplements you take  Include the dosages of medicines and the time you take them each day   Glasses, dentures or hearing aids   Minimal clothing; you will be wearing hospital sleepwear   Photo ID; required to verify your identity   If you have a Living Will or Power of , bring a copy of the documents   If you have an ostomy, bring an extra pouch and any supplies you use    Do not bring   Medicines or inhalers   Money, valuables or jewelry    What other information should I know about the day of surgery?  Notify your surgeons if you develop a cold, sore throat, cough, fever, rash or any other illness   Report to the Ambulatory Surgical/Same Day Surgery Unit   You will be instructed to stop at Registration only if you have not been pre-registered   Inform your  fi they do not stay that they will be asked by the staff to leave a phone number where they can be reached   Be available to be reached before surgery  In the event the operating room schedule changes, you may be asked to come in earlier or later than expected    *It is important to tell your doctor and others involved in your health care if you are taking or have been taking any non-prescription drugs, vitamins, minerals, herbals or other nutritional supplements   Any of these may interact with some food or medicines and cause a reaction

## 2019-09-22 ENCOUNTER — ANESTHESIA EVENT (OUTPATIENT)
Dept: PERIOP | Facility: HOSPITAL | Age: 59
End: 2019-09-22
Payer: COMMERCIAL

## 2019-09-22 NOTE — ANESTHESIA PREPROCEDURE EVALUATION
Review of Systems/Medical History  Patient summary reviewed  Chart reviewed  No history of anesthetic complications     Cardiovascular  Hypertension ,    Pulmonary       GI/Hepatic      Comment: S/p gastric bypass 2010          Endo/Other     GYN       Hematology   Musculoskeletal    Arthritis     Neurology   Psychology           Physical Exam    Airway    Mallampati score: II  TM Distance: >3 FB  Neck ROM: full     Dental       Cardiovascular  Rhythm: regular, Rate: normal,     Pulmonary  Breath sounds clear to auscultation,     Other Findings        Anesthesia Plan  ASA Score- 2     Anesthesia Type- regional and IV sedation with anesthesia with ASA Monitors  Additional Monitors:   Airway Plan:     Comment: Right IS block with Exparel  Plan Factors-    Induction- intravenous  Postoperative Plan-     Informed Consent- Anesthetic plan and risks discussed with patient  I personally reviewed this patient with the CRNA  Discussed and agreed on the Anesthesia Plan with the CRNA  Kylah Guaman

## 2019-09-23 ENCOUNTER — HOSPITAL ENCOUNTER (OUTPATIENT)
Facility: HOSPITAL | Age: 59
Setting detail: OUTPATIENT SURGERY
Discharge: HOME | End: 2019-09-23
Attending: ORTHOPAEDIC SURGERY | Admitting: ORTHOPAEDIC SURGERY
Payer: COMMERCIAL

## 2019-09-23 ENCOUNTER — ANESTHESIA (OUTPATIENT)
Dept: PERIOP | Facility: HOSPITAL | Age: 59
End: 2019-09-23
Payer: COMMERCIAL

## 2019-09-23 VITALS
HEART RATE: 73 BPM | DIASTOLIC BLOOD PRESSURE: 69 MMHG | SYSTOLIC BLOOD PRESSURE: 102 MMHG | HEIGHT: 61 IN | RESPIRATION RATE: 18 BRPM | TEMPERATURE: 97.4 F | OXYGEN SATURATION: 99 % | BODY MASS INDEX: 25.49 KG/M2 | WEIGHT: 135 LBS

## 2019-09-23 PROCEDURE — 93005 ELECTROCARDIOGRAM TRACING: CPT

## 2019-09-23 PROCEDURE — 23700 MNPJ ANES SHO JT FIXJ APRATS: CPT | Performed by: ORTHOPAEDIC SURGERY

## 2019-09-23 PROCEDURE — C9290 INJ, BUPIVACAINE LIPOSOME: HCPCS

## 2019-09-23 RX ORDER — SODIUM CHLORIDE, SODIUM LACTATE, POTASSIUM CHLORIDE, CALCIUM CHLORIDE 600; 310; 30; 20 MG/100ML; MG/100ML; MG/100ML; MG/100ML
75 INJECTION, SOLUTION INTRAVENOUS CONTINUOUS
Status: DISCONTINUED | OUTPATIENT
Start: 2019-09-23 | End: 2019-09-23 | Stop reason: HOSPADM

## 2019-09-23 RX ORDER — BUPIVACAINE HYDROCHLORIDE 5 MG/ML
INJECTION, SOLUTION PERINEURAL
Status: COMPLETED | OUTPATIENT
Start: 2019-09-23 | End: 2019-09-23

## 2019-09-23 RX ORDER — FENTANYL CITRATE/PF 50 MCG/ML
25 SYRINGE (ML) INJECTION
Status: DISCONTINUED | OUTPATIENT
Start: 2019-09-23 | End: 2019-09-23 | Stop reason: HOSPADM

## 2019-09-23 RX ORDER — ONDANSETRON 2 MG/ML
4 INJECTION INTRAMUSCULAR; INTRAVENOUS ONCE
Status: DISCONTINUED | OUTPATIENT
Start: 2019-09-23 | End: 2019-09-23 | Stop reason: HOSPADM

## 2019-09-23 RX ORDER — LIDOCAINE HYDROCHLORIDE 10 MG/ML
INJECTION, SOLUTION INFILTRATION; PERINEURAL AS NEEDED
Status: DISCONTINUED | OUTPATIENT
Start: 2019-09-23 | End: 2019-09-23 | Stop reason: SURG

## 2019-09-23 RX ORDER — PROPOFOL 10 MG/ML
INJECTION, EMULSION INTRAVENOUS AS NEEDED
Status: DISCONTINUED | OUTPATIENT
Start: 2019-09-23 | End: 2019-09-23 | Stop reason: SURG

## 2019-09-23 RX ORDER — FENTANYL CITRATE 50 UG/ML
INJECTION, SOLUTION INTRAMUSCULAR; INTRAVENOUS AS NEEDED
Status: DISCONTINUED | OUTPATIENT
Start: 2019-09-23 | End: 2019-09-23 | Stop reason: SURG

## 2019-09-23 RX ORDER — MIDAZOLAM HYDROCHLORIDE 1 MG/ML
INJECTION INTRAMUSCULAR; INTRAVENOUS AS NEEDED
Status: DISCONTINUED | OUTPATIENT
Start: 2019-09-23 | End: 2019-09-23 | Stop reason: SURG

## 2019-09-23 RX ADMIN — LIDOCAINE HYDROCHLORIDE 50 MG: 10 INJECTION, SOLUTION INFILTRATION; PERINEURAL at 12:49

## 2019-09-23 RX ADMIN — PROPOFOL 75 MG: 10 INJECTION, EMULSION INTRAVENOUS at 12:49

## 2019-09-23 RX ADMIN — BUPIVACAINE HYDROCHLORIDE 5 ML: 5 INJECTION, SOLUTION PERINEURAL at 12:36

## 2019-09-23 RX ADMIN — SODIUM CHLORIDE, SODIUM LACTATE, POTASSIUM CHLORIDE, AND CALCIUM CHLORIDE 75 ML/HR: .6; .31; .03; .02 INJECTION, SOLUTION INTRAVENOUS at 11:45

## 2019-09-23 RX ADMIN — MIDAZOLAM HYDROCHLORIDE 2 MG: 1 INJECTION, SOLUTION INTRAMUSCULAR; INTRAVENOUS at 12:32

## 2019-09-23 RX ADMIN — FENTANYL CITRATE 50 MCG: 50 INJECTION, SOLUTION INTRAMUSCULAR; INTRAVENOUS at 12:32

## 2019-09-23 NOTE — OP NOTE
OPERATIVE REPORT  PATIENT NAME: Cecilia Pereira    :  1960  MRN: [id number]  Pt Location: [facility] OR ROOM 03    SURGERY DATE: 2019    Surgeon(s) and Role:     * Margaret Lopes MD - Primary    Preop Diagnosis:  Adhesive capsulitis of right shoulder [M75 01]    Post-Op Diagnosis Codes:     * Adhesive capsulitis of right shoulder [M75 01]    Procedure:  Right shoulder manipulation under anesthesia    Specimen(s):  * No specimens in log *    Estimated Blood Loss:   0    Drains:  * No LDAs found *    Anesthesia Type:   Regional with Sedation    Operative Indications:  Adhesive capsulitis of right shoulder Helen Alejo is a 47-year-old female who has been suffering with right shoulder adhesive capsulitis  She has failed conservative measures such as therapy and anti-inflammatories and wished to undergo a right shoulder manipulation under anesthesia  She understood the risks and benefits of that procedure wished to go ahead  The risks are inclusive of but not limited to persistence or recurrence of pain and stiffness, nerve injury, fracture, failure to regain full strength and ability, failure to achieve anticipated results, and need for further surgery  MRI preoperatively demonstrated no obvious signs of rotator cuff tear  Operative Findings:  Right shoulder exam under anesthesia prior to manipulation demonstrated forward flexion of 60° abduction of 60° external rotation to 5° internal rotation to 10°  After manipulation, we achieved excellent range of motion of 170° of forward flexion abduction 160° external rotation to 80° internal rotation to 80°  Complications:   None    Procedure and Technique:  Beatrice Robledo was taken to the post anesthesia care unit where anesthesia placed a regional block which was observed under nursing care as well  We then performed a surgical time-out to identify the right shoulder as the correct operative site    After sedation was administered, I then assessed her range of motion passively under anesthesia as described above  We then performed a gentle manipulation under anesthesia achieving significant crepitus as the shoulder released the adhesions  Forward flexion was achieved 1st followed by external and internal rotation  We did achieve excellent results  She was then placed into a sling and awoke from anesthesia without difficulty  She will follow up in 14 days and will start a rigorous course of physical therapy tomorrow which will be 5 days per week x3 weeks to help prevent recurrence  Her sling will be in place only until the block wears off from a motor standpoint     I was present for the entire procedure    Patient Disposition:  PACU     SIGNATURE: John Randall MD  DATE: September 23, 2019  TIME: 12:57 PM

## 2019-09-23 NOTE — DISCHARGE INSTRUCTIONS
Instruction Sheet following your shoulder manipulation    Sling:   Wear your sling until you block wears off  You may use your arm as tolerated for activities of daily living once the sling is discontinued  Again we encourage you to use this arm  Physical therapy:   You should be scheduled to start this tomorrow  If this has not already been set up for you please call our office immediately  Ice:   You can ice the shoulder to reduce swelling and discomfort  Do not ice the shoulder more than 20 minutes at a time  Let the shoulder warm up before reapplication  Avoid getting your incisions wet  Follow-up visit:   You need to see the doctor 7-10 days following surgery for your first post-op visit  Common Concerns:   Bruising and/or swelling of the shoulder region are common after surgery  To relieve this discomfort it is best to ice the shoulder  You may also get swelling in the hand, which is also common after surgery  REMEMBER - these are only guidelines  If you have any questions or concerns please do not hesitate to call the office at any time [phone]

## 2019-09-23 NOTE — ANESTHESIA POSTPROCEDURE EVALUATION
Post-Op Assessment Note    CV Status:  Stable  Pain Score: 0       Mental Status:  Sleepy   Hydration Status:  Stable and euvolemic   PONV Controlled:  None   Airway Patency:  Patent   Post Op Vitals Reviewed: Yes      Staff: CRNA   Comments: vss          BP 96/63 (09/23/19 1300)    Temp (!) 97 4 °F (36 3 °C) (09/23/19 1255)    Pulse 73 (09/23/19 1300)   Resp 18 (09/23/19 1300)    SpO2 99 % (09/23/19 1300)

## 2019-09-23 NOTE — PERIOPERATIVE NURSING NOTE
Pt d/c to home at this time  Right arm remains in sling with exposed fingers warm-denies pain  Pt left with all belongings  Iv was D/C intact with dry sterile dressing  Encouraged to keep follow up appointments, Verbalized understanding  D/C instructions reviewed and explained  Verbalized understanding  New Rx given and explained

## 2019-09-23 NOTE — ANESTHESIA PROCEDURE NOTES
Peripheral Block    Patient location during procedure: holding area  Start time: 9/23/2019 12:35 PM  Staffing  Anesthesiologist: Soha Cárdenas MD  Preanesthetic Checklist  Completed: patient identified, site marked, surgical consent, pre-op evaluation, timeout performed, IV checked, risks and benefits discussed and monitors and equipment checked  Peripheral Block  Patient position: supine  Prep: ChloraPrep  Patient monitoring: heart rate, cardiac monitor, continuous pulse ox and frequent blood pressure checks  Block type: interscalene  Laterality: right  Injection technique: single-shot  Procedures: ultrasound guided, Ultrasound guidance required for the procedure to increase accuracy and safety of medication placement and decrease risk of complications  Ultrasound permanent image savedbupivacaine (MARCAINE) 0 5 % perineural infiltration, 5 mL (20 m,l of exparel)  Needle  Needle type: Stimuplex   Needle gauge: 22 G  Needle length: 10 cm  Needle localization: ultrasound guidance  Assessment  Injection assessment: negative aspiration for heme, negative aspiration for CSF and local visualized surrounding nerve on ultrasound  Paresthesia pain: none  Heart rate change: no  Slow fractionated injection: yes  Post-procedure:  site cleaned  patient tolerated the procedure well with no immediate complications  Additional Notes  Shahab Parmar present for time out and procedure  Chantel Jaeger present for time out and procedure

## 2019-09-23 NOTE — INTERVAL H&P NOTE
H&P reviewed  After examining the patient I find no changes in the patients condition since the H&P had been written      Vitals:    09/23/19 1113   BP: 108/69   Pulse: 67   Resp: 18   Temp: (!) 95 6 °F (35 3 °C)   SpO2: 98%

## 2019-09-24 ENCOUNTER — EVALUATION (OUTPATIENT)
Dept: PHYSICAL THERAPY | Facility: CLINIC | Age: 59
End: 2019-09-24
Payer: COMMERCIAL

## 2019-09-24 ENCOUNTER — TELEPHONE (OUTPATIENT)
Dept: OBGYN CLINIC | Facility: HOSPITAL | Age: 59
End: 2019-09-24

## 2019-09-24 DIAGNOSIS — M75.01 ADHESIVE CAPSULITIS OF RIGHT SHOULDER: Primary | ICD-10-CM

## 2019-09-24 LAB
ATRIAL RATE: 70 BPM
P AXIS: 50 DEGREES
PR INTERVAL: 156 MS
QRS AXIS: -7 DEGREES
QRSD INTERVAL: 80 MS
QT INTERVAL: 390 MS
QTC INTERVAL: 421 MS
T WAVE AXIS: 22 DEGREES
VENTRICULAR RATE: 70 BPM

## 2019-09-24 PROCEDURE — 97161 PT EVAL LOW COMPLEX 20 MIN: CPT

## 2019-09-24 PROCEDURE — 93010 ELECTROCARDIOGRAM REPORT: CPT | Performed by: INTERNAL MEDICINE

## 2019-09-24 NOTE — PROGRESS NOTES
PT Evaluation     Today's date: 2019  Patient name: Osiel Puckett  : 1960  MRN: [id number]  Referring provider: Susie Paula MD  Dx:   Encounter Diagnosis     ICD-10-CM    1  Adhesive capsulitis of right shoulder M75 01                   Assessment  Assessment details: Osiel Puckett is a 59 y o  female who is one day status post R shoulder manipulation under anesthesia and presents with signs and symptoms consistent with the referring diagnosis of Adhesive capsulitis of right shoulder  Patient presents with the following impairments: pain, decreased strength, decreased ROM, decreased joint mobility, postural dysfunction and impaired sensation  Due to these impairments, patient has difficulty performing the following: ADL's, recreational activities, lifting/carrying, reaching overhead, self-care activities, sidelying right, gripping, fine motor activities   Patient has been educated in home exercise program and plan of care  Patient would benefit from skilled physical therapy services to address the above functional limitations and progress towards prior level of function and independence with home exercise program   Impairments: abnormal muscle firing, abnormal or restricted ROM, activity intolerance, impaired physical strength, lacks appropriate home exercise program, pain with function, scapular dyskinesis and poor posture   Understanding of Dx/Px/POC: good   Prognosis: good    Goals  Short Term Goals (1 week)  1  Patient will be independent with HEP  2  Patient will demonstrate an increase in right shoulder AROM by 20%  Long Term Goals (3 weeks)  1  Patient will demonstrate an increase in right shoulder AROM to WNL in order to promote bathing/dressing without pain  2  Patient will no longer have difficulty sleeping due to right shoulder pain  3  Patient will present with FOTO score of 62 in order to demonstrate improved functional ADLs      Plan  Patient would benefit from: skilled physical therapy  Planned modality interventions: cryotherapy, electrical stimulation/Russian stimulation, TENS, ultrasound, high voltage pulsed current: pain management, thermotherapy: hydrocollator packs, unattended electrical stimulation and high voltage pulsed current: spasm management  Planned therapy interventions: manual therapy, joint mobilization, neuromuscular re-education, patient education, postural training, strengthening, stretching, therapeutic exercise, home exercise program, abdominal trunk stabilization, ADL training, body mechanics training, flexibility, functional ROM exercises, graded activity, graded exercise, therapeutic training, therapeutic activities, self care and IADL retraining  Frequency: 5x week  Duration in weeks: 3  Treatment plan discussed with: patient        Subjective Evaluation    History of Present Illness  Date of surgery: 2019  Mechanism of injury: Patient reports shoulder right shoulder pain that began a few months ago  Patient reports experiencing left frozen shoulder 8 years ago that required an ZARA  She denies any injury to the right shoulder  Patient reports she underwent ZARA surgery yesterday and was referred to outpatient PT  Quality of life: good    Pain  Current pain ratin  At best pain ratin  At worst pain ratin  Location: Anterior right shoulder  Quality: dull ache  Relieving factors: change in position  Aggravating factors: overhead activity and lifting  Progression: improved    Social Support  Steps to enter house: yes  Stairs in house: yes     Hand dominance: right    Treatments  Previous treatment: physical therapy and injection treatment  Current treatment: physical therapy  Patient Goals  Patient goals for therapy: decreased pain, increased motion, increased strength and independence with ADLs/IADLs          Objective     Static Posture     Head  Forward  Shoulders  Rounded      Observations     Additional Observation Details  Patient arrived without R shoulder sling  Neurological Testing     Sensation     Shoulder     Right Shoulder   Hyposensation: light touch    Active Range of Motion   Left Shoulder   Flexion: 170 degrees   Abduction: 170 degrees   External rotation 0°: 80 degrees   Internal rotation 0°: 60 degrees     Additional Active Range of Motion Details  Unable to assess R shoulder AROM due to nerve block (pt unable to lift R arm in supine with arm supported)    Passive Range of Motion     Right Shoulder   Flexion: 145 degrees with pain  Abduction: 90 degrees with pain  External rotation 45°: 25 degrees with pain  Internal rotation 45°: 65 degrees     Additional Passive Range of Motion Details  Mild pain noted with R shoulder ER > abd > flex PROM  Strength/Myotome Testing     Left Shoulder     Planes of Motion   Flexion: 5   Abduction: 5   External rotation at 0°: 5   Internal rotation at 0°: 5     Additional Strength Details  Right shoulder MMT not assessed due to nerve block  R hand  strength: fair             Precautions: HTN, s/p ZARA on 9/23/19    Daily Treatment Diary     Manual  9/24       PROM R shoulder Performed (gentle)                                           Exercise Diary  EVAL       R shoulder AAROM HEP (gentle)                                                                                                                                       Pt edu/HEP Performed               Time            Modalities                                      Reviewed post-operative instructions with patient per Dr Raul Diaz notes, including wearing R shoulder sling until nerve block wears off  Patient demonstrated good understanding

## 2019-09-24 NOTE — TELEPHONE ENCOUNTER
Patient called in requesting guidance as far as PT is concerned  She stated today that she could not do anything in PT because she is still numb  She would like to know if she should go to PT tomorrow, as it is a waste of her time and money to go if she cannot do anything in the sessions  Advised her that if she is still numb in the morning, she could call PT and let them know and possibly reschedule but she should play it by ear depending on how numb she is tomorrow  Patient agreed and will reassess tomorrow morning

## 2019-09-25 ENCOUNTER — OFFICE VISIT (OUTPATIENT)
Dept: PHYSICAL THERAPY | Facility: CLINIC | Age: 59
End: 2019-09-25
Payer: COMMERCIAL

## 2019-09-25 DIAGNOSIS — M75.01 ADHESIVE CAPSULITIS OF RIGHT SHOULDER: Primary | ICD-10-CM

## 2019-09-25 PROCEDURE — 97110 THERAPEUTIC EXERCISES: CPT

## 2019-09-25 PROCEDURE — 97140 MANUAL THERAPY 1/> REGIONS: CPT

## 2019-09-25 NOTE — TELEPHONE ENCOUNTER
She should absolutely still go to PT  They will work on passive ROM of the shoulder  This is essential so her shoulder does not freeze again  The longer hte shoulder feels numb, the better, as this will allow them to range the shoulder pain free  IT IS ESSENTIAL THAT SHE GOES DAILY FOR THIS!

## 2019-09-25 NOTE — PROGRESS NOTES
Daily Note     Today's date: 2019  Patient name: Honey Mathew  : 1960  MRN: [id number]  Referring provider: Mardella Cogan,*  Dx:   Encounter Diagnosis     ICD-10-CM    1  Adhesive capsulitis of right shoulder M75 01                   Subjective: My shoulder is still numb  Patient arrived wearing R shoulder sling  Objective: See treatment diary below      Assessment: Tolerated treatment fair  Patient would benefit from continued PT   Written HEP provided with patient  voicing understanding  Plan: Progress treatment as tolerated         Precautions: HTN, s/p ZARA on 19    Daily Treatment Diary     Manual        PROM R shoulder -flexion,ER,IR Performed (gentle) As tolerated                                          Exercise Diary  EVAL       R shoulder AAROM HEP (gentle) Cane ER; flexion (as tolerated)      R UE FIS w/blue pb  20x 5sec hold      pendulums  4x10      scap retractions supine  10x      R elbow AROM supine  10x                                                                                                      Pt edu/HEP Performed performed              Time            Modalities        CP R shoulder supine  10min

## 2019-09-30 ENCOUNTER — OFFICE VISIT (OUTPATIENT)
Dept: PHYSICAL THERAPY | Facility: CLINIC | Age: 59
End: 2019-09-30
Payer: COMMERCIAL

## 2019-09-30 DIAGNOSIS — M75.01 ADHESIVE CAPSULITIS OF RIGHT SHOULDER: Primary | ICD-10-CM

## 2019-09-30 PROCEDURE — 97140 MANUAL THERAPY 1/> REGIONS: CPT

## 2019-09-30 PROCEDURE — 97110 THERAPEUTIC EXERCISES: CPT

## 2019-09-30 NOTE — PROGRESS NOTES
Daily Note     Today's date: 2019  Patient name: Murtaza Arenas  : 1960  MRN: [id number]  Referring provider: Jd Combs,*  Dx:   Encounter Diagnosis     ICD-10-CM    1  Adhesive capsulitis of right shoulder M75 01                   Subjective: My shoulder is very stiff  Objective: See treatment diary below      Assessment: Tolerated treatment fair  Patient would benefit from continued PT   R shoulder PROM flexion 145 degrees ; ER 40 degrees (supine)      Plan: Progress treatment as tolerated         Precautions: HTN, s/p ZARA on 19    Daily Treatment Diary     Manual       PROM R shoulder -flexion,ER,IR Performed (gentle) As tolerated As tolerated     R ST mobs   perf                                 Exercise Diary  EVAL      R shoulder AAROM HEP (gentle) Cane ER; flexion (as tolerated) ---     R UE FIS w/blue pb  20x 5sec hold 20x 5sec hold     pendulums  4x10 4x10     scap retractions supine  10x 2x10 supine     R elbow AROM supine  10x 10x     pulleys   [id number]                                                                                             Pt edu/HEP Performed performed              Time            Modalities        CP R shoulder supine  10min MH x10 min

## 2019-10-01 ENCOUNTER — OFFICE VISIT (OUTPATIENT)
Dept: PHYSICAL THERAPY | Facility: CLINIC | Age: 59
End: 2019-10-01
Payer: COMMERCIAL

## 2019-10-01 DIAGNOSIS — M75.01 ADHESIVE CAPSULITIS OF RIGHT SHOULDER: Primary | ICD-10-CM

## 2019-10-01 PROCEDURE — 97140 MANUAL THERAPY 1/> REGIONS: CPT | Performed by: PHYSICAL THERAPIST

## 2019-10-01 PROCEDURE — 97110 THERAPEUTIC EXERCISES: CPT | Performed by: PHYSICAL THERAPIST

## 2019-10-01 PROCEDURE — 97112 NEUROMUSCULAR REEDUCATION: CPT | Performed by: PHYSICAL THERAPIST

## 2019-10-01 NOTE — PROGRESS NOTES
Daily Note     Today's date: 10/1/2019  Patient name: Betsy Rivero  : 1960  MRN: [id number]  Referring provider: Misbah Graf,*  Dx:   Encounter Diagnosis     ICD-10-CM    1  Adhesive capsulitis of right shoulder M75 01                   Subjective: My neck is sore from yesterday  Objective: See treatment diary below      Assessment: Tolerated treatment well  Patient demonstrated fatigue post treatment and exhibited good technique with therapeutic exercises      Plan: Continue per plan of care        Precautions: HTN, s/p ZARA on 19    Daily Treatment Diary     Manual  9/24 9/25 9/30 10/1    PROM R shoulder -flexion,ER,IR Performed (gentle) As tolerated As tolerated perf    R ST mobs   perf perf                                Exercise Diary  EVAL 9/25 9/30 10/1    R shoulder AAROM HEP (gentle) Cane ER; flexion (as tolerated) --- [place] er    R UE FIS w/blue pb  20x 5sec hold 20x 5sec hold 20x    pendulums  4x10 4x10 40x    scap retractions supine  10x 2x10 supine 20x    R elbow AROM supine  10x 10x 15x    pulleys   [id number] 10x10s Flex, ER    Wall  slide    20x                                                                                    Pt edu/HEP Performed performed              Time            Modalities        CP R shoulder supine  10min MH x10 min

## 2019-10-02 ENCOUNTER — OFFICE VISIT (OUTPATIENT)
Dept: PHYSICAL THERAPY | Facility: CLINIC | Age: 59
End: 2019-10-02
Payer: COMMERCIAL

## 2019-10-02 DIAGNOSIS — M75.01 ADHESIVE CAPSULITIS OF RIGHT SHOULDER: Primary | ICD-10-CM

## 2019-10-02 PROCEDURE — 97140 MANUAL THERAPY 1/> REGIONS: CPT

## 2019-10-02 PROCEDURE — 97110 THERAPEUTIC EXERCISES: CPT

## 2019-10-02 NOTE — PROGRESS NOTES
Daily Note     Today's date: 10/2/2019  Patient name: Anali Hammonds  : 1960  MRN: [id number]  Referring provider: Tammy Wong,*  Dx:   Encounter Diagnosis     ICD-10-CM    1  Adhesive capsulitis of right shoulder M75 01                   Subjective: My shoulder is very sore  I see the Dr on Friday  Objective: See treatment diary below      Assessment: Tolerated treatment fair  Patient demonstrated fatigue post treatment      Plan: Progress treatment as tolerated         Precautions: HTN, s/p ZARA on 19    Daily Treatment Diary     Manual  9/24 9/25 9/30 10/1 102   PROM R shoulder -flexion,ER,IR Performed (gentle) As tolerated As tolerated perf perf   R ST mobs   perf perf perf                               Exercise Diary  EVAL 9/25 9/30 10/1 10/2   R shoulder AAROM HEP (gentle) Cane ER; flexion (as tolerated) --- [place] er [place] ER / cane flexion x10 reps each   R UE FIS w/blue pb  20x 5sec hold 20x 5sec hold 20x 20x 5sec   pendulums  4x10 4x10 40x 2# 4x10   scap retractions supine  10x 2x10 supine 20x 2x10   R elbow AROM supine  10x 10x 15x 20x   pulleys   [id number] 10x10s Flex, ER [id number]   Wall  slide    20x 20x                                                                                   Pt edu/HEP Performed performed              Time            Modalities     10/2   CP R shoulder supine  10min MH x10 min  MH pre/CP post

## 2019-10-04 ENCOUNTER — OFFICE VISIT (OUTPATIENT)
Dept: OBGYN CLINIC | Facility: CLINIC | Age: 59
End: 2019-10-04
Payer: COMMERCIAL

## 2019-10-04 VITALS
HEIGHT: 61 IN | BODY MASS INDEX: 25.6 KG/M2 | SYSTOLIC BLOOD PRESSURE: 110 MMHG | WEIGHT: 135.6 LBS | HEART RATE: 86 BPM | DIASTOLIC BLOOD PRESSURE: 68 MMHG

## 2019-10-04 DIAGNOSIS — M75.01 ADHESIVE CAPSULITIS OF RIGHT SHOULDER: Primary | ICD-10-CM

## 2019-10-04 PROCEDURE — 99213 OFFICE O/P EST LOW 20 MIN: CPT | Performed by: ORTHOPAEDIC SURGERY

## 2019-10-04 NOTE — PROGRESS NOTES
Assessment/Plan:  1  Adhesive capsulitis of right shoulder         Scribe Attestation    I,:   Nataly Haja, MA am acting as a scribe while in the presence of the attending physician :        I,:   Angel Lou MD personally performed the services described in this documentation    as scribed in my presence :            Thomas Rubio and I engaged in a discussion today in regards to her right shoulder  She does still continue to have limitations after manipulation under anesthesia on 09/23/2019  There was initially some confusion at her 1st physical therapy appointment  This appears to have been cleared up at this point  She unfortunately is not able to get to therapy more than 2-3 times per week  I did stress with her the importance of doing as much of the exercises possible at home  We spoke about possible need in the future for another manipulation if she does not maintain the range of motion that we achieved intraoperatively  At this point in time I would like her to continue with formal physical therapy  Thomas Rubio did bring in the images from her MRI today which were reviewed in the office  I do overall agree with the impression that there is a small tear to the infraspinatus tendon however everything else appears to be intact  She will follow up with me in the office in 4 weeks time  Subjective:   Tenna Goltz is a 59 y o  female who presents to the office today for a follow up evaluation s/p right shoulder manipulation under anesthesia on 09/23/2019  Patient states she has been attending physical therapy  She is frustrated with the progress and her limited range of motion  She states she still has soreness about the shoulder  She is unable to take ibuprofen due to gastric bypass  She does occasionally take Tylenol for pain relief  She denies any new injuries  She denies any distal paresthesias  I did read through the physical therapy notes        Review of Systems   Constitutional: Negative for chills, fever and unexpected weight change  HENT: Negative for hearing loss, nosebleeds and sore throat  Eyes: Negative for pain, redness and visual disturbance  Respiratory: Negative for cough, shortness of breath and wheezing  Cardiovascular: Negative for chest pain, palpitations and leg swelling  Gastrointestinal: Negative for abdominal pain, nausea and vomiting  Endocrine: Negative for polydipsia and polyuria  Genitourinary: Negative for dyspareunia and hematuria  Musculoskeletal: Positive for arthralgias and myalgias  Negative for joint swelling  Skin: Negative for rash and wound  Neurological: Negative for dizziness, numbness and headaches  Psychiatric/Behavioral: Negative for decreased concentration and suicidal ideas  The patient is not nervous/anxious            Past Medical History:   Diagnosis Date    Frozen shoulder syndrome     Hypertension     Wears glasses        Past Surgical History:   Procedure Laterality Date    COLONOSCOPY      age 46 yr    EGD      GASTRIC BYPASS  2010    [place] [address] ANESTHESIA Right 9/23/2019    Procedure: MANIPULATION JOINT SHOULDER;  Surgeon: Latrice Moura MD;  Location: [facility];  Service: Orthopedics    SHOULDER SURGERY Left 2011       Family History   Problem Relation Age of Onset    Lung cancer Mother     Hypertension Mother     Cancer Mother         lung    Hypertension Father     Cancer Father         prostate    Hypertension Sister     No Known Problems Brother     No Known Problems Daughter     Hypertension Sister     No Known Problems Daughter        Social History     Occupational History    Not on file   Tobacco Use    Smoking status: Never Smoker    Smokeless tobacco: Never Used   Substance and Sexual Activity    Alcohol use: Yes     Frequency: 2-4 times a month     Comment: Socially    Drug use: Never    Sexual activity: Not on file         Current Outpatient Medications:    Calcium-Magnesium-Vitamin D (CALCIUM MAGNESIUM PO), Take by mouth 2 (two) times a day, Disp: , Rfl:     Cholecalciferol (VITAMIN D PO), Take 5,000 Units by mouth daily after lunch , Disp: , Rfl:     losartan-hydrochlorothiazide (HYZAAR) 100-25 MG per tablet, daily at bedtime , Disp: , Rfl:     Multiple Vitamin (MULTIVITAMIN) tablet, Take 1 tablet by mouth daily, Disp: , Rfl:     Allergies   Allergen Reactions    Penicillins Other (See Comments)     Joint stiffness       Objective:  Vitals:    10/04/19 1446   BP: 110/68   Pulse: 86       Right Shoulder Exam     Tenderness   The patient is experiencing no tenderness  Range of Motion   External rotation: 30   Forward flexion: 90   Internal rotation 90 degrees: 30     Other   Erythema: absent  Scars: absent  Sensation: normal  Pulse: present            Physical Exam   Constitutional: She is oriented to person, place, and time  She appears well-developed and well-nourished  HENT:   Head: Normocephalic and atraumatic  Eyes: Pupils are equal, round, and reactive to light  Conjunctivae are normal    Neck: Normal range of motion  Neck supple  Cardiovascular: Normal rate and intact distal pulses  Pulmonary/Chest: Effort normal  No respiratory distress  Musculoskeletal:   As noted in HPI   Neurological: She is alert and oriented to person, place, and time  Skin: Skin is warm and dry  Psychiatric: She has a normal mood and affect  Her behavior is normal    Nursing note and vitals reviewed  I have personally reviewed pertinent films in PACS and my interpretation is as follows:  MRI from an outside provider demonstrates small intersital tear of infraspinatus and a small benign cyst noted at the humeral head  The report was available previously although the images I am 1st viewing today  They were previously not available

## 2019-10-07 ENCOUNTER — OFFICE VISIT (OUTPATIENT)
Dept: PHYSICAL THERAPY | Facility: CLINIC | Age: 59
End: 2019-10-07
Payer: COMMERCIAL

## 2019-10-07 DIAGNOSIS — M75.01 ADHESIVE CAPSULITIS OF RIGHT SHOULDER: Primary | ICD-10-CM

## 2019-10-07 PROCEDURE — 97110 THERAPEUTIC EXERCISES: CPT

## 2019-10-07 PROCEDURE — 97140 MANUAL THERAPY 1/> REGIONS: CPT

## 2019-10-07 NOTE — PROGRESS NOTES
Daily Note     Today's date: 10/7/2019  Patient name: Paul Dodge  : 1960  MRN: [id number]  Referring provider: Yohan Jackson,*  Dx:   Encounter Diagnosis     ICD-10-CM    1  Adhesive capsulitis of right shoulder M75 01                   Subjective: I took Tylenol at 5 30 am  "It just always hurts " I've been having a very hard time sleeping because of the pain  Objective: See treatment diary below      Assessment: Tolerated treatment fair  Patient would benefit from continued PT   R shoulder PROM flexion 157 degrees ; ER 48 degrees (supine)      Plan: Progress treatment as tolerated         Precautions: HTN, s/p ZARA on 19    Daily Treatment Diary     Manual  10/7    10/2   PROM R shoulder -flexion,ER,IR Performed As tolerated As tolerated perf perf   R ST mobs perf  perf perf perf                               Exercise Diary  10/7    10/2   R shoulder AAROM hep Cane ER; flexion (as tolerated) --- [place] er Cane ER / cane flexion x10 reps each   R UE FIS w/blue pb 20 x 5 sec hold 20x 5sec hold 20x 5sec hold 20x 20x 5sec   pendulums 2# 4x10 4x10 4x10 40x 2# 4x10   scap retractions supine 2x10 10x 2x10 supine 20x 2x10   R elbow AROM supine 20x 2# 10x 10x 15x 20x   pulleys [id number]  [id number] 10x10s Flex, ER [id number]   Wall  slides 20x   20x 20x                                                                                   Pt edu/HEP Performed performed              Time            Modalities 10/7     10/2   MH R shoulder supine 10min    MH pre/CP post

## 2019-10-08 ENCOUNTER — OFFICE VISIT (OUTPATIENT)
Dept: PHYSICAL THERAPY | Facility: CLINIC | Age: 59
End: 2019-10-08
Payer: COMMERCIAL

## 2019-10-08 DIAGNOSIS — M75.01 ADHESIVE CAPSULITIS OF RIGHT SHOULDER: Primary | ICD-10-CM

## 2019-10-08 PROCEDURE — 97140 MANUAL THERAPY 1/> REGIONS: CPT | Performed by: PHYSICAL THERAPIST

## 2019-10-08 PROCEDURE — 97110 THERAPEUTIC EXERCISES: CPT | Performed by: PHYSICAL THERAPIST

## 2019-10-08 NOTE — PROGRESS NOTES
Daily Note     Today's date: 10/8/2019  Patient name: Rod Fregoso  : 1960  MRN: [id number]  Referring provider: Josefa Rivera,*  Dx:   Encounter Diagnosis     ICD-10-CM    1  Adhesive capsulitis of right shoulder M75 01                   Subjective: my right shoulder is very painful and stiff, and not improved  Objective: See treatment diary below      Assessment: Tolerated treatment well  Patient demonstrated fatigue post treatment and exhibited good technique with therapeutic exercises      Plan: Continue per plan of care        Precautions: HTN, s/p ZARA on 19         Manual  10/8            Jt mob long distract Gr4 10x            Jt mob lat distract Gr4 10x                                                       Exercise Diary  10/8            Cane Flex, Er 2x20            Pulleys  Flex, ER 20x2            FIS W ball 20x            Wall slide 20x                                                                                                                                                                                                                                Modalities  10/8            Cp  perf

## 2019-10-09 ENCOUNTER — APPOINTMENT (OUTPATIENT)
Dept: PHYSICAL THERAPY | Facility: CLINIC | Age: 59
End: 2019-10-09
Payer: COMMERCIAL

## 2019-10-14 ENCOUNTER — APPOINTMENT (OUTPATIENT)
Dept: PHYSICAL THERAPY | Facility: CLINIC | Age: 59
End: 2019-10-14
Payer: COMMERCIAL

## 2019-10-16 ENCOUNTER — OFFICE VISIT (OUTPATIENT)
Dept: PHYSICAL THERAPY | Facility: CLINIC | Age: 59
End: 2019-10-16
Payer: COMMERCIAL

## 2019-10-16 DIAGNOSIS — M75.01 ADHESIVE CAPSULITIS OF RIGHT SHOULDER: Primary | ICD-10-CM

## 2019-10-16 PROCEDURE — 97140 MANUAL THERAPY 1/> REGIONS: CPT

## 2019-10-16 PROCEDURE — 97112 NEUROMUSCULAR REEDUCATION: CPT

## 2019-10-16 NOTE — PROGRESS NOTES
Daily Note      Today's date: 10/16/2019  Patient name: Jen Trevino  : 1960  MRN: [id number]  Referring provider: Dejah Sherman,*  Dx:   Encounter Diagnosis     ICD-10-CM    1  Adhesive capsulitis of right shoulder M75 01        Subjective: Pt reports that right shoulder is stiff today  Objective: See treatment diary below    Assessment: Pt tolerated treatment fair  Pt continues to present with ER PROM as greatest deficit  Pt introduced to contract-relax for shoulder PROM in flexion and ER and presented with pain, however PROM improved slightly following treatment  Plan: Continue per plan of care  Precautions: HTN, s/p ZARA on 19         Manual  10/8 10/16      Jt mob long distract Gr4 10x Perf      Jt mob lat distract Gr4 10x Perf      R shoulder PROM in all planes  Perf (contract-relax                          Exercise Diary  10/8 10/16      Cane Flex, Er 2x20 20x ea        Pulleys  Flex, ER 20x2 20x      FIS W ball 20x 20x      Wall slide    20x 20x                                                                                                                                          Modalities  10/8 10/16      Cp  perf       HP R shoulder    10' pre-tx

## 2019-10-21 ENCOUNTER — APPOINTMENT (OUTPATIENT)
Dept: PHYSICAL THERAPY | Facility: CLINIC | Age: 59
End: 2019-10-21
Payer: COMMERCIAL

## 2019-10-23 ENCOUNTER — OFFICE VISIT (OUTPATIENT)
Dept: PHYSICAL THERAPY | Facility: CLINIC | Age: 59
End: 2019-10-23
Payer: COMMERCIAL

## 2019-10-23 DIAGNOSIS — M75.01 ADHESIVE CAPSULITIS OF RIGHT SHOULDER: Primary | ICD-10-CM

## 2019-10-23 PROCEDURE — 97112 NEUROMUSCULAR REEDUCATION: CPT

## 2019-10-23 PROCEDURE — 97140 MANUAL THERAPY 1/> REGIONS: CPT

## 2019-10-23 NOTE — PROGRESS NOTES
Daily Note     Today's date: 10/23/2019  Patient name: Rosalia Carter  : 1960  MRN: [id number]  Referring provider: Amrik Mccarthy,*  Dx:   Encounter Diagnosis     ICD-10-CM    1  Adhesive capsulitis of right shoulder M75 01                   Subjective: "A little better today " Sleeping is still very painful  Objective: See treatment diary below      Assessment: Tolerated treatment fair  Patient would benefit from continued PT   R shoulder flexion AAROM 140 degrees  Plan: Progress treatment as tolerated  Precautions: HTN, s/p ZARA on 19         Manual  10/8 10/16 10/23     Jt mob long distract Gr4 10x Perf perf     Jt mob lat distract Gr4 10x Perf perf     R shoulder PROM in all planes  Perf (contract-relax perf                         Exercise Diary  10/8 10/16 10/23     Cane Flex, Er 2x20 20x ea   20x      Pulleys  Flex, ER 20x2 20x 20x     FIS W ball 20x 20x 20x     Wall slide    20x 20x 10x                                                                                                                                         Modalities  10/8 10/16      Cp  perf       HP R shoulder    10' pre-tx

## 2019-10-28 ENCOUNTER — OFFICE VISIT (OUTPATIENT)
Dept: PHYSICAL THERAPY | Facility: CLINIC | Age: 59
End: 2019-10-28
Payer: COMMERCIAL

## 2019-10-28 DIAGNOSIS — M75.01 ADHESIVE CAPSULITIS OF RIGHT SHOULDER: Primary | ICD-10-CM

## 2019-10-28 PROCEDURE — 97112 NEUROMUSCULAR REEDUCATION: CPT

## 2019-10-28 PROCEDURE — 97140 MANUAL THERAPY 1/> REGIONS: CPT

## 2019-10-28 NOTE — PROGRESS NOTES
Daily Note     Today's date: 10/28/2019  Patient name: Wilder Seen  : 1960  MRN: [id number]  Referring provider: Vaibhav Holman,*  Dx:   Encounter Diagnosis     ICD-10-CM    1  Adhesive capsulitis of right shoulder M75 01                   Subjective: My shoulder is very tight  Objective: See treatment diary below      Assessment: Tolerated treatment fair  Patient demonstrated fatigue post treatment   +TTP R deltoid region  Plan: Progress treatment as tolerated  Precautions: HTN, s/p ZARA on 19         Manual  10/8 10/16 10/23 10/28    Jt mob long distract Gr4 10x Perf perf perf    Jt mob lat distract Gr4 10x Perf perf perf    R shoulder PROM in all planes  Perf (contract-relax perf perf                        Exercise Diary  10/8 10/16 10/23 10/28    Cane Flex, Er 2x20 20x ea   20x  hep    Pulleys  Flex, ER 20x2 20x 20x Flexion x 20 reps    FIS W ball 20x 20x 20x 20x    Wall slide    20x 20x 10x 20x    TB rows    Red x20 reps    IR/ER isometrics supine    10x 5 sec hold    pendulums    3# 4x10 reps                                                                                                                Modalities  10/8 10/16  10/28    Cp  perf   ----    HP R shoulder    10' pre-tx  10min supine

## 2019-10-30 ENCOUNTER — APPOINTMENT (OUTPATIENT)
Dept: PHYSICAL THERAPY | Facility: CLINIC | Age: 59
End: 2019-10-30
Payer: COMMERCIAL

## 2020-01-08 ENCOUNTER — HOSPITAL ENCOUNTER (EMERGENCY)
Facility: HOSPITAL | Age: 60
Discharge: HOME | End: 2020-01-08
Attending: EMERGENCY MEDICINE | Admitting: EMERGENCY MEDICINE
Payer: COMMERCIAL

## 2020-01-08 VITALS
BODY MASS INDEX: 25.7 KG/M2 | DIASTOLIC BLOOD PRESSURE: 71 MMHG | SYSTOLIC BLOOD PRESSURE: 128 MMHG | HEART RATE: 79 BPM | OXYGEN SATURATION: 99 % | WEIGHT: 136 LBS | RESPIRATION RATE: 20 BRPM | TEMPERATURE: 97 F
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
## 2020-01-08 PROCEDURE — 99284 EMERGENCY DEPT VISIT MOD MDM: CPT | Performed by: EMERGENCY MEDICINE

## 2020-01-08 PROCEDURE — 99283 EMERGENCY DEPT VISIT LOW MDM: CPT

## 2020-01-08 RX ORDER — GINSENG 100 MG
1 CAPSULE ORAL ONCE
Status: COMPLETED | OUTPATIENT
Start: 2020-01-08 | End: 2020-01-08

## 2020-01-08 RX ADMIN — BACITRACIN 1 SMALL APPLICATION: 500 OINTMENT TOPICAL at 21:27

## 2020-01-09 NOTE — ED NOTES
Dr Barb Dorsey at bedside, cut the ring from L finger, pt tolerates well  Yellow metal ring is given back to the patient        Kisha Lutz RN  01/08/20 6562

## 2020-01-09 NOTE — ED NOTES
L second finger is soaked in [place], area is dried with DD   Bacitracin is applied as per MD Ayo Garvin, CHANTEL  01/08/20 2126

## 2020-01-09 NOTE — ED PROVIDER NOTES
History  Chief Complaint   Patient presents with    Finger Swelling     states got L ring finger caught in something tonight, has ring on finger , afraid it will swell more     Patient got her hand caught in a piece of machinery and pulled on her wedding band which caused a abrasion to her finger and finger started swelling so the patient arrived here hoping to get the 5th ring cut off so she could get her finger free from the ring  Ring was not constricting at this point but if the patient would have more swelling could potentially cause an issue so we did cut the finger ring off  Prior to Admission Medications   Prescriptions Last Dose Informant Patient Reported? Taking?    Calcium-Magnesium-Vitamin D (CALCIUM MAGNESIUM PO)   Yes No   Sig: Take by mouth 2 (two) times a day   Cholecalciferol (VITAMIN D PO)   Yes No   Sig: Take 5,000 Units by mouth daily after lunch    Multiple Vitamin (MULTIVITAMIN) tablet   Yes No   Sig: Take 1 tablet by mouth daily   losartan-hydrochlorothiazide (HYZAAR) 100-25 MG per tablet   Yes No   Sig: daily at bedtime       Facility-Administered Medications: None       Past Medical History:   Diagnosis Date    Frozen shoulder syndrome     Hypertension     Wears glasses        Past Surgical History:   Procedure Laterality Date    COLONOSCOPY      age 46 yr    EGD      GASTRIC BYPASS  2010    [place] ANESTHESIA Right 9/23/2019    Procedure: MANIPULATION JOINT SHOULDER;  Surgeon: Osvaldo Hillman MD;  [facility] [place];  Service: Orthopedics    SHOULDER SURGERY Left 2011       Family History   Problem Relation Age of Onset    Lung cancer Mother     Hypertension Mother     Cancer Mother         lung    Hypertension Father     Cancer Father         prostate    Hypertension Sister     No Known Problems Brother     No Known Problems Daughter     Hypertension Sister     No Known Problems Daughter      I have reviewed and agree with the history as documented  Social History     Tobacco Use    Smoking status: Never Smoker    Smokeless tobacco: Never Used   Substance Use Topics    Alcohol use: Yes     Frequency: 2-4 times a month     Comment: Socially    Drug use: Never        Review of Systems   Constitutional: Negative for activity change, chills, diaphoresis and fever  HENT: Negative for congestion, ear pain, nosebleeds, sore throat, trouble swallowing and voice change  Eyes: Negative for pain, discharge and redness  Respiratory: Negative for apnea, cough, choking, shortness of breath, wheezing and stridor  Cardiovascular: Negative for chest pain and palpitations  Gastrointestinal: Negative for abdominal distention, abdominal pain, constipation, diarrhea, nausea and vomiting  Endocrine: Negative for polydipsia  Genitourinary: Negative for difficulty urinating, dysuria, flank pain, frequency, hematuria and urgency  Musculoskeletal: Negative for back pain, gait problem, joint swelling, myalgias, neck pain and neck stiffness  Skin: Negative for pallor and rash  Neurological: Negative for dizziness, tremors, syncope, speech difficulty, weakness, numbness and headaches  Hematological: Negative for adenopathy  Psychiatric/Behavioral: Negative for confusion, hallucinations, self-injury and suicidal ideas  The patient is not nervous/anxious  Physical Exam  Physical Exam   Constitutional: She is oriented to person, place, and time  She appears well-developed and well-nourished  No distress  HENT:   Head: Normocephalic and atraumatic  Right Ear: External ear normal    Left Ear: External ear normal    Nose: Nose normal    Mouth/Throat: Oropharynx is clear and moist    Eyes: Pupils are equal, round, and reactive to light  Conjunctivae are normal    Neck: Normal range of motion  Neck supple  Cardiovascular: Normal rate, regular rhythm, normal heart sounds and intact distal pulses     Pulmonary/Chest: Effort normal and breath sounds normal    Abdominal: Soft  Bowel sounds are normal    Musculoskeletal: Normal range of motion  Neurological: She is alert and oriented to person, place, and time  She has normal reflexes  Skin: Skin is warm and dry  She is not diaphoretic  Psychiatric: She has a normal mood and affect  Nursing note and vitals reviewed  Vital Signs  ED Triage Vitals [01/08/20 2054]   Temperature Pulse Respirations Blood Pressure SpO2   (!) 97 °F (36 1 °C) 79 20 128/71 99 %      Temp Source Heart Rate Source Patient Position - Orthostatic VS BP Location FiO2 (%)   Tympanic Monitor Sitting Right arm --      Pain Score       6           Vitals:    01/08/20 2054   BP: 128/71   Pulse: 79   Patient Position - Orthostatic VS: Sitting         Visual Acuity      ED Medications  Medications   bacitracin topical ointment 1 small application (1 small application Topical Given 1/8/20 2127)       Diagnostic Studies  Results Reviewed     None                 No orders to display              Procedures  Procedures         ED Course                               MDM      Disposition  Final diagnoses:   Ring or other jewelry causing external constriction, initial encounter     Time reflects when diagnosis was documented in both MDM as applicable and the Disposition within this note     Time User Action Codes Description Comment    1/8/2020  9:26 PM Rajiv Amherst Add [I85 684R,  W49 04XA] Tight ring on finger     1/8/2020  9:26 PM Rajiv Amherst Add [Z12 04LU] Ring or other jewelry causing external constriction, initial encounter     1/8/2020  9:26 PM Rajiv Amherst Modify [H12 62HZ] Ring or other jewelry causing external constriction, initial encounter     1/8/2020  9:26 PM Precious Perdomo [C40 581D,  W49 04XA] Tight ring on finger       ED Disposition     ED Disposition Condition Date/Time Comment    Discharge Stable Wed Jan 8, 2020  9:26 PM [facility] discharge to home/self care              Follow-up Information     Follow up With Specialties Details Why Antwan Whalen MD   As needed           Discharge Medication List as of 1/8/2020  9:27 PM      CONTINUE these medications which have NOT CHANGED    Details   Calcium-Magnesium-Vitamin D (CALCIUM MAGNESIUM PO) Take by mouth 2 (two) times a day, Historical Med      Cholecalciferol (VITAMIN D PO) Take 5,000 Units by mouth daily after lunch , Historical Med      losartan-hydrochlorothiazide (HYZAAR) 100-25 MG per tablet daily at bedtime , Starting Mon 7/15/2019, Historical Med      Multiple Vitamin (MULTIVITAMIN) tablet Take 1 tablet by mouth daily, Historical Med           No discharge procedures on file      ED Provider  Electronically Signed by           Armida German DO  01/08/20 4265

## 2021-04-14 ENCOUNTER — IMMUNIZATIONS (OUTPATIENT)
Dept: FAMILY MEDICINE CLINIC | Facility: HOSPITAL | Age: 61
End: 2021-04-14

## 2021-04-14 DIAGNOSIS — Z23 ENCOUNTER FOR IMMUNIZATION: Primary | ICD-10-CM

## 2021-04-14 PROCEDURE — 91301 SARS-COV-2 / COVID-19 MRNA VACCINE (MODERNA) 100 MCG: CPT

## 2021-04-14 PROCEDURE — 0011A SARS-COV-2 / COVID-19 MRNA VACCINE (MODERNA) 100 MCG: CPT

## 2021-05-17 ENCOUNTER — IMMUNIZATIONS (OUTPATIENT)
Dept: FAMILY MEDICINE CLINIC | Facility: HOSPITAL | Age: 61
End: 2021-05-17

## 2021-05-17 DIAGNOSIS — Z23 ENCOUNTER FOR IMMUNIZATION: Primary | ICD-10-CM

## 2021-05-17 PROCEDURE — 91301 SARS-COV-2 / COVID-19 MRNA VACCINE (MODERNA) 100 MCG: CPT

## 2021-05-17 PROCEDURE — 0012A SARS-COV-2 / COVID-19 MRNA VACCINE (MODERNA) 100 MCG: CPT

## 2021-06-22 ENCOUNTER — OFFICE VISIT (OUTPATIENT)
Dept: PULMONOLOGY | Facility: CLINIC | Age: 61
End: 2021-06-22
Payer: COMMERCIAL

## 2021-06-22 VITALS
OXYGEN SATURATION: 100 % | HEIGHT: 61 IN | BODY MASS INDEX: 25.71 KG/M2 | WEIGHT: 136.2 LBS | HEART RATE: 82 BPM | DIASTOLIC BLOOD PRESSURE: 64 MMHG | SYSTOLIC BLOOD PRESSURE: 110 MMHG | TEMPERATURE: 97 F

## 2021-06-22 DIAGNOSIS — M35.1 MIXED CONNECTIVE TISSUE DISEASE (HCC): Primary | ICD-10-CM

## 2021-06-22 PROCEDURE — 3008F BODY MASS INDEX DOCD: CPT | Performed by: INTERNAL MEDICINE

## 2021-06-22 PROCEDURE — 99204 OFFICE O/P NEW MOD 45 MIN: CPT | Performed by: INTERNAL MEDICINE

## 2021-06-22 PROCEDURE — 1036F TOBACCO NON-USER: CPT | Performed by: INTERNAL MEDICINE

## 2021-06-22 RX ORDER — TRAMADOL HYDROCHLORIDE 50 MG/1
50 TABLET ORAL EVERY 6 HOURS PRN
COMMUNITY

## 2021-06-22 RX ORDER — CHLORAL HYDRATE 500 MG
1000 CAPSULE ORAL DAILY
COMMUNITY

## 2021-06-22 NOTE — ASSESSMENT & PLAN NOTE
At the request of the patient's Rheumatologist, we will collect PFTs  It's not ultimately clear from her notes what medications will be started for her, but we will establish her baseline either way  She has no current pulmonary symptoms, so no indication to check imaging from my perspective  She is not limited by shortness of breath    A baseline screening ECHO may be reasonable, but I would defer that to the Rheumatologist

## 2021-06-22 NOTE — PROGRESS NOTES
Pulmonary Consultation   Kateryna Chandler 56 y o  female MRN: [id number]  6/22/2021      Assessment:    Mixed connective tissue disease (RUST 75 )  At the request of the patient's Rheumatologist, we will collect PFTs  It's not ultimately clear from her notes what medications will be started for her, but we will establish her baseline either way  She has no current pulmonary symptoms, so no indication to check imaging from my perspective  She is not limited by shortness of breath  A baseline screening ECHO may be reasonable, but I would defer that to the Rheumatologist     Plan:    Diagnoses and all orders for this visit:    Mixed connective tissue disease (RUST 75 )  -     Complete PFT with post bronchodilator; Future    No follow-ups on file  Follow up PRN  History of Present Illness   HPI:  Kateryna Chandler is a 56 y o  female who presents for initial evaluation after a recent diagnosis of mixed connective tissue disorder  She reports she has been given this diagnosis after having been evaluated for years for adhesive capsulitis in her bilateral shoulders  She has had operations on both shoulders with minimal symptomatic relief  However, more recently she has had serologic workup including an CARLTON with mixed speckled and nuclear staining, and evaluation through a Rheumatologist, Dr Rima Slaughter, suggests that this may be representative of MCTD  From a pulmonary perspective, she lacks symptoms of cough, shortness of breath, and concerning findings of pulmonary hypertension  Due to the anticipation of potentially needing pulmonary toxic medications, a request was made for pulmonary function tests, for which she is seeing us today  She had no other significant medical history  Aside from the bilateral repair of adhesive capsulitis, she has had no surgeries  She never smoked and works as a  for [employer]  She has no family history of lung conditions or cancers      Review of Systems   Respiratory: Negative for cough, shortness of breath and wheezing  All other systems reviewed and are negative  Historical Information   Past Medical History:   Diagnosis Date    Frozen shoulder syndrome     Hypertension     Wears glasses      Past Surgical History:   Procedure Laterality Date    COLONOSCOPY      age 46 yr    EGD      GASTRIC BYPASS  2010    [place] [address] ANESTHESIA Right 9/23/2019    Procedure: MANIPULATION JOINT SHOULDER;  Surgeon: Eva De La Rosa MD;  Location: [facility];  Service: Orthopedics    SHOULDER SURGERY Left 2011     Family History   Problem Relation Age of Onset    Lung cancer Mother     Hypertension Mother     Cancer Mother         lung    Hypertension Father     Cancer Father         prostate    Hypertension Sister     No Known Problems Brother     No Known Problems Daughter     Hypertension Sister     No Known Problems Daughter      Meds/Allergies     Current Outpatient Medications:     Calcium-Magnesium-Vitamin D (CALCIUM MAGNESIUM PO), Take by mouth 2 (two) times a day, Disp: , Rfl:     Cholecalciferol (VITAMIN D PO), Take 5,000 Units by mouth daily after lunch , Disp: , Rfl:     losartan-hydrochlorothiazide (HYZAAR) 100-25 MG per tablet, daily at bedtime , Disp: , Rfl:     Multiple Vitamin (MULTIVITAMIN) tablet, Take 1 tablet by mouth daily, Disp: , Rfl:     Omega-3 Fatty Acids (fish oil) 1,000 mg, Take 1,000 mg by mouth daily, Disp: , Rfl:     traMADol (ULTRAM) 50 mg tablet, Take 50 mg by mouth every 6 (six) hours as needed for moderate pain, Disp: , Rfl:   Allergies   Allergen Reactions    Penicillins Other (See Comments)     Joint stiffness     Vitals: Blood pressure 110/64, pulse 82, temperature (!) 97 °F (36 1 °C), height 5' 1" (1 549 m), weight 61 8 kg (136 lb 3 2 oz), SpO2 100 %  Body mass index is 25 73 kg/m²  Oxygen Therapy  SpO2: 100 %    Physical Exam  Physical Exam  Vitals reviewed     Constitutional: General: She is not in acute distress  Appearance: Normal appearance  She is well-developed  She is not ill-appearing  HENT:      Head: Normocephalic and atraumatic  Eyes:      General: No scleral icterus  Conjunctiva/sclera: Conjunctivae normal    Neck:      Vascular: No JVD  Cardiovascular:      Rate and Rhythm: Normal rate and regular rhythm  Heart sounds: Normal heart sounds  No murmur heard  No friction rub  No gallop  Pulmonary:      Effort: Pulmonary effort is normal  No respiratory distress  Breath sounds: Normal breath sounds  No wheezing or rales  Musculoskeletal:      Cervical back: Neck supple  Right lower leg: No edema  Left lower leg: No edema  Skin:     General: Skin is warm and dry  Findings: No rash  Neurological:      General: No focal deficit present  Mental Status: She is alert and oriented to person, place, and time  Mental status is at baseline  Psychiatric:         Mood and Affect: Mood normal          Behavior: Behavior normal        Labs: I have personally reviewed pertinent lab results  No results found for: WBC, HGB, HCT, MCV, PLT  No results found for: GLUCOSE, CALCIUM, NA, K, CO2, CL, BUN, CREATININE  No results found for: IGE  No results found for: ALT, AST, GGT, ALKPHOS, BILITOT    External labs also reviewed, with CARLTON pattern as indicated above  Imaging and other studies: I have personally reviewed pertinent reports  EKG, Pathology, and Other Studies: I have personally reviewed pertinent reports  [facility]

## 2021-07-08 ENCOUNTER — HOSPITAL ENCOUNTER (OUTPATIENT)
Dept: PULMONOLOGY | Facility: HOSPITAL | Age: 61
Discharge: HOME | End: 2021-07-08
Attending: INTERNAL MEDICINE
Payer: COMMERCIAL

## 2021-07-08 DIAGNOSIS — M35.1 MIXED CONNECTIVE TISSUE DISEASE (HCC): ICD-10-CM

## 2021-07-08 PROCEDURE — 94726 PLETHYSMOGRAPHY LUNG VOLUMES: CPT

## 2021-07-08 PROCEDURE — 94726 PLETHYSMOGRAPHY LUNG VOLUMES: CPT | Performed by: INTERNAL MEDICINE

## 2021-07-08 PROCEDURE — 94760 N-INVAS EAR/PLS OXIMETRY 1: CPT

## 2021-07-08 PROCEDURE — 94060 EVALUATION OF WHEEZING: CPT | Performed by: INTERNAL MEDICINE

## 2021-07-08 PROCEDURE — 94729 DIFFUSING CAPACITY: CPT

## 2021-07-08 PROCEDURE — 94729 DIFFUSING CAPACITY: CPT | Performed by: INTERNAL MEDICINE

## 2021-07-08 PROCEDURE — 94060 EVALUATION OF WHEEZING: CPT

## 2021-07-08 RX ORDER — ALBUTEROL SULFATE 2.5 MG/3ML
2.5 SOLUTION RESPIRATORY (INHALATION) ONCE
Status: COMPLETED | OUTPATIENT
Start: 2021-07-08 | End: 2021-07-08

## 2021-07-08 RX ADMIN — ALBUTEROL SULFATE 2.5 MG: 2.5 SOLUTION RESPIRATORY (INHALATION) at 16:32

## 2021-07-12 ENCOUNTER — TELEPHONE (OUTPATIENT)
Dept: PULMONOLOGY | Facility: CLINIC | Age: 61
End: 2021-07-12

## 2021-12-27 ENCOUNTER — IMMUNIZATIONS (OUTPATIENT)
Dept: FAMILY MEDICINE CLINIC | Facility: HOSPITAL | Age: 61
End: 2021-12-27

## 2021-12-27 DIAGNOSIS — Z23 ENCOUNTER FOR IMMUNIZATION: Primary | ICD-10-CM

## 2021-12-27 PROCEDURE — 0064A COVID-19 MODERNA VACC 0.25 ML BOOSTER: CPT

## 2021-12-27 PROCEDURE — 91306 COVID-19 MODERNA VACC 0.25 ML BOOSTER: CPT

## 2022-06-22 ENCOUNTER — HOSPITAL ENCOUNTER (EMERGENCY)
Facility: HOSPITAL | Age: 62
Discharge: HOME | End: 2022-06-22
Attending: EMERGENCY MEDICINE
Payer: COMMERCIAL

## 2022-06-22 VITALS
TEMPERATURE: 97 F | BODY MASS INDEX: 25.2 KG/M2 | DIASTOLIC BLOOD PRESSURE: 68 MMHG | SYSTOLIC BLOOD PRESSURE: 115 MMHG | HEART RATE: 79 BPM | OXYGEN SATURATION: 100 % | WEIGHT: 133.38 LBS | RESPIRATION RATE: 16 BRPM

## 2022-06-22 DIAGNOSIS — L02.213 CHEST WALL ABSCESS: Primary | ICD-10-CM

## 2022-06-22 PROCEDURE — 87070 CULTURE OTHR SPECIMN AEROBIC: CPT | Performed by: EMERGENCY MEDICINE

## 2022-06-22 PROCEDURE — 87205 SMEAR GRAM STAIN: CPT | Performed by: EMERGENCY MEDICINE

## 2022-06-22 PROCEDURE — 10060 I&D ABSCESS SIMPLE/SINGLE: CPT | Performed by: EMERGENCY MEDICINE

## 2022-06-22 PROCEDURE — 99284 EMERGENCY DEPT VISIT MOD MDM: CPT | Performed by: EMERGENCY MEDICINE

## 2022-06-22 PROCEDURE — 99283 EMERGENCY DEPT VISIT LOW MDM: CPT

## 2022-06-22 RX ORDER — LIDOCAINE HYDROCHLORIDE 10 MG/ML
5 INJECTION, SOLUTION EPIDURAL; INFILTRATION; INTRACAUDAL; PERINEURAL ONCE
Status: COMPLETED | OUTPATIENT
Start: 2022-06-22 | End: 2022-06-22

## 2022-06-22 RX ORDER — CEPHALEXIN 500 MG/1
500 CAPSULE ORAL ONCE
Status: COMPLETED | OUTPATIENT
Start: 2022-06-22 | End: 2022-06-22

## 2022-06-22 RX ORDER — HYDROXYCHLOROQUINE SULFATE 200 MG/1
200 TABLET, FILM COATED ORAL 2 TIMES DAILY WITH MEALS
COMMUNITY

## 2022-06-22 RX ORDER — CEPHALEXIN 500 MG/1
500 CAPSULE ORAL EVERY 8 HOURS SCHEDULED
Qty: 21 CAPSULE | Refills: 0 | Status: SHIPPED | OUTPATIENT
Start: 2022-06-22 | End: 2022-06-29

## 2022-06-22 RX ADMIN — CEPHALEXIN 500 MG: 500 CAPSULE ORAL at 18:55

## 2022-06-22 RX ADMIN — LIDOCAINE HYDROCHLORIDE 5 ML: 10 INJECTION, SOLUTION EPIDURAL; INFILTRATION; INTRACAUDAL at 18:56

## 2022-06-22 NOTE — ED PROVIDER NOTES
History  Chief Complaint   Patient presents with    Abscess     Abscess on chest for about 3 weeks  Thought it was a bite but getting larger and painful to touch     Patient here with complaint of 3 weeks of localized swelling to right chest wall  She thinks that it started as an insect bite, however it has increased in size  Patient showed a picture of the lesion approximately 3 weeks ago and says seems to have been about the same size  She denies fevers or chills  She denies purulent drainage  Patient has a prior medical history of hypertension  History provided by:  Patient   used: No        Prior to Admission Medications   Prescriptions Last Dose Informant Patient Reported? Taking?    Calcium-Magnesium-Vitamin D (CALCIUM MAGNESIUM PO)  Self Yes No   Sig: Take by mouth 2 (two) times a day   Cholecalciferol (VITAMIN D PO)  Self Yes No   Sig: Take 5,000 Units by mouth daily after lunch    Multiple Vitamin (MULTIVITAMIN) tablet  Self Yes No   Sig: Take 1 tablet by mouth daily   Omega-3 Fatty Acids (fish oil) 1,000 mg  Self Yes No   Sig: Take 1,000 mg by mouth daily   hydroxychloroquine (PLAQUENIL) 200 mg tablet   Yes Yes   Sig: Take 200 mg by mouth 2 (two) times a day with meals   losartan-hydrochlorothiazide (HYZAAR) 100-25 MG per tablet  Self Yes No   Sig: daily at bedtime    traMADol (ULTRAM) 50 mg tablet Not Taking at Unknown time Self Yes No   Sig: Take 50 mg by mouth every 6 (six) hours as needed for moderate pain   Patient not taking: Reported on 6/22/2022      Facility-Administered Medications: None       Past Medical History:   Diagnosis Date    Frozen shoulder syndrome     Hypertension     Wears glasses        Past Surgical History:   Procedure Laterality Date    COLONOSCOPY      age 46 yr    EGD      GASTRIC BYPASS  2010    [place] MANIPULATOLU SHGEORGIAR JT W ANESTHESIA Right 9/23/2019    Procedure: MANIPULATION JOINT SHOULDER;  Surgeon: Bon Pace MD;  Location: [facility] OR;  Service: Orthopedics    SHOULDER SURGERY Left 2011       Family History   Problem Relation Age of Onset    Lung cancer Mother     Hypertension Mother     Cancer Mother         lung    Hypertension Father     Cancer Father         prostate    Hypertension Sister     No Known Problems Brother     No Known Problems Daughter     Hypertension Sister     No Known Problems Daughter      I have reviewed and agree with the history as documented  E-Cigarette/Vaping    E-Cigarette Use Never User      E-Cigarette/Vaping Substances    Nicotine No     THC No     CBD No     Flavoring No     Other No      Social History     Tobacco Use    Smoking status: Never Smoker    Smokeless tobacco: Never Used   Vaping Use    Vaping Use: Never used   Substance Use Topics    Alcohol use: Yes     Comment: Socially    Drug use: Never       Review of Systems   Constitutional: Negative for chills and fever  Respiratory: Negative for cough, chest tightness and shortness of breath  Gastrointestinal: Negative for abdominal pain, diarrhea, nausea and vomiting  Genitourinary: Negative for dysuria, frequency, hematuria and urgency  Musculoskeletal: Negative for back pain, neck pain and neck stiffness  Skin: Negative for color change, pallor, rash and wound  All other systems reviewed and are negative  Physical Exam  Physical Exam  Vitals and nursing note reviewed  Constitutional:       General: She is not in acute distress  Appearance: She is well-developed  She is not diaphoretic  HENT:      Head: Normocephalic and atraumatic  Eyes:      Extraocular Movements: Extraocular movements intact  Conjunctiva/sclera: Conjunctivae normal       Pupils: Pupils are equal, round, and reactive to light  Cardiovascular:      Rate and Rhythm: Normal rate and regular rhythm  Pulmonary:      Effort: Pulmonary effort is normal  No respiratory distress     Chest:      Chest wall: Swelling and tenderness present  Musculoskeletal:         General: No deformity  Normal range of motion  Cervical back: Normal range of motion and neck supple  Skin:     General: Skin is warm and dry  Capillary Refill: Capillary refill takes less than 2 seconds  Coloration: Skin is not pale  Findings: No rash  Neurological:      General: No focal deficit present  Mental Status: She is alert and oriented to person, place, and time  Cranial Nerves: No cranial nerve deficit  Psychiatric:         Behavior: Behavior normal          Vital Signs  ED Triage Vitals [06/22/22 1659]   Temperature Pulse Respirations Blood Pressure SpO2   (!) 97 °F (36 1 °C) 79 16 115/68 100 %      Temp Source Heart Rate Source Patient Position - Orthostatic VS BP Location FiO2 (%)   Tympanic Monitor Sitting Right arm --      Pain Score       5           Vitals:    06/22/22 1659   BP: 115/68   Pulse: 79   Patient Position - Orthostatic VS: Sitting         Visual Acuity      ED Medications  Medications   lidocaine (PF) (XYLOCAINE-MPF) 1 % injection 5 mL (5 mL Infiltration Given 6/22/22 1856)   cephalexin (KEFLEX) capsule 500 mg (500 mg Oral Given 6/22/22 1855)       Diagnostic Studies  Results Reviewed     Procedure Component Value Units Date/Time    Wound culture and Gram stain [[id number]]  (Abnormal) Collected: 06/22/22 1858    Lab Status: Preliminary result Specimen: Wound from Breast, Right Updated: 06/23/22 1207     Wound Culture No growth     Gram Stain Result 1+ Polys      Rare Gram negative rods                 No orders to display              Procedures  Incision and drain    Date/Time: 6/22/2022 6:45 PM  Performed by: Thalia Meza DO  Authorized by: Thalia Meza DO   Universal Protocol:  Consent: Verbal consent obtained    Risks and benefits: risks, benefits and alternatives were discussed  Consent given by: patient  Time out: Immediately prior to procedure a "time out" was called to verify the correct patient, procedure, equipment, support staff and site/side marked as required  Timeout called at: 6/22/2022 6:45 PM   Patient understanding: patient states understanding of the procedure being performed  Patient consent: the patient's understanding of the procedure matches consent given  Site marked: the operative site was marked  Patient identity confirmed: verbally with patient      Patient location:  ED  Location:     Type:  Cyst    Location:  Trunk    Trunk location:  Chest  Pre-procedure details:     Skin preparation:  Chloraprep  Anesthesia (see MAR for exact dosages): Anesthesia method:  Local infiltration    Local anesthetic:  Lidocaine 1% w/o epi  Procedure details:     Complexity:  Complex    Needle aspiration: yes      Needle size:  18 G    Incision types:  Stab incision    Scalpel blade:  11    Approach:  Open    Incision depth:  Subcutaneous    Drainage:  Bloody and purulent    Drainage amount: Moderate    Wound treatment:  Wound left open    Packing materials:  None  Post-procedure details:     Patient tolerance of procedure:   Tolerated well, no immediate complications             ED Course                                             MDM  Number of Diagnoses or Management Options  Chest wall abscess: new and requires workup     Amount and/or Complexity of Data Reviewed  Clinical lab tests: ordered and reviewed    Risk of Complications, Morbidity, and/or Mortality  Presenting problems: high  Diagnostic procedures: high  Management options: high    Patient Progress  Patient progress: stable      Disposition  Final diagnoses:   Chest wall abscess     Time reflects when diagnosis was documented in both MDM as applicable and the Disposition within this note     Time User Action Codes Description Comment    6/22/2022  7:06 PM [address], [address] [[id number]] Chest wall abscess       ED Disposition     ED Disposition   Discharge    Condition   Stable    Date/Time   Wed Jun 22, 2022  7:06 PM Comment   Jimmy Muir discharge to home/self care  Follow-up Information     Follow up With Specialties Details Why Contact Info Additional Information    Teagan Zapata MD Internal Medicine, Pulmonary Disease Schedule an appointment as soon as possible for a visit in 2 days for follow up [address]  [phone]       [facility] Schedule an appointment as soon as possible for a visit in 2 days for follow up [address] [facility] [address] [place], [place], [address] [place] [address], [id number], [phone]          Discharge Medication List as of 6/22/2022  7:36 PM      START taking these medications    Details   cephalexin (KEFLEX) 500 mg capsule Take 1 capsule (500 mg total) by mouth every 8 (eight) hours for 7 days, Starting Wed 6/22/2022, Until Wed 6/29/2022, Normal         CONTINUE these medications which have NOT CHANGED    Details   hydroxychloroquine (PLAQUENIL) 200 mg tablet Take 200 mg by mouth 2 (two) times a day with meals, Historical Med      Calcium-Magnesium-Vitamin D (CALCIUM MAGNESIUM PO) Take by mouth 2 (two) times a day, Historical Med      Cholecalciferol (VITAMIN D PO) Take 5,000 Units by mouth daily after lunch , Historical Med      losartan-hydrochlorothiazide (HYZAAR) 100-25 MG per tablet daily at bedtime , Starting Mon 7/15/2019, Historical Med      Multiple Vitamin (MULTIVITAMIN) tablet Take 1 tablet by mouth daily, Historical Med      Omega-3 Fatty Acids (fish oil) 1,000 mg Take 1,000 mg by mouth daily, Historical Med      traMADol (ULTRAM) 50 mg tablet Take 50 mg by mouth every 6 (six) hours as needed for moderate pain, Historical Med             No discharge procedures on file      PDMP Review     None          ED Provider  Electronically Signed by           Nancy Hawkins DO  06/24/22 0122

## 2022-06-22 NOTE — DISCHARGE INSTRUCTIONS
Take medication as prescribed  Return to the ER for further concerns or worsening symptoms  Follow up with your primary care physician in 1-2 days

## 2022-06-27 LAB
BACTERIA WND AEROBE CULT: NO GROWTH
GRAM STN SPEC: ABNORMAL

## 2022-07-05 ENCOUNTER — OFFICE VISIT (OUTPATIENT)
Dept: DERMATOLOGY | Age: 62
End: 2022-07-05
Payer: COMMERCIAL

## 2022-07-05 VITALS — HEIGHT: 61 IN | TEMPERATURE: 97.9 F | WEIGHT: 130.2 LBS | BODY MASS INDEX: 24.58 KG/M2

## 2022-07-05 DIAGNOSIS — L02.93 CARBUNCLE: Primary | ICD-10-CM

## 2022-07-05 PROCEDURE — 99202 OFFICE O/P NEW SF 15 MIN: CPT | Performed by: DERMATOLOGY

## 2022-07-05 NOTE — PATIENT INSTRUCTIONS
CARBUNCLE       Assessment and Plan:  Based on a thorough discussion of this condition and the management approach to it (including a comprehensive discussion of the known risks, side effects and potential benefits of treatment), the patient (family) agrees to implement the following specific plan:  No future treatment   If the lesion comes back wound culture will be performed

## 2022-07-05 NOTE — PROGRESS NOTES
[facility] Note     Patient Name: Jeffy Osei  Encounter Date: [phone]     Have you been cared for by a [facility] in the last 3 years and, if so, which one? No    · Have you traveled outside of the [address] [place] [address] in the past 3 months or outside of the [facility] area in the last 2 weeks? No     May we call your Preferred Phone number to discuss your specific medical information? Yes     May we leave a detailed message that includes your specific medical information? Yes      Today's Chief Concerns:   Concern #1:  Infected lesion on  Right chest wall     Concern #2:      Past Medical History:  Have you personally ever had or currently have any of the following? · Skin cancer (such as Melanoma, Basal Cell Carcinoma, Squamous Cell Carcinoma? (If Yes, please provide more detail)- No  · Eczema: No  · Psoriasis: No  · HIV/AIDS: No  · Hepatitis B or C: No  · Tuberculosis: No  · Systemic Immunosuppression such as Diabetes, Biologic or Immunotherapy, Chemotherapy, Organ Transplantation, Bone Marrow Transplantation (If YES, please provide more detail): No  · Radiation Treatment (If YES, please provide more detail): No  · Any other major medical conditions/concerns? (If Yes, which types)- No    Social History:     What is/was your primary occupation?        Family History:  Have any of your "first degree relatives" (parent, brother, sister, or child) had any of the following       · Skin cancer such as Melanoma or Merkel Cell Carcinoma or Pancreatic Cancer? No  · Eczema, Asthma, Hay Fever or Seasonal Allergies: YES, mom and sister eczema   · Psoriasis or Psoriatic Arthritis: No  · Do any other medical conditions seem to run in your family? If Yes, what condition and which relatives?   No    Current Medications:   (please update all dermatological medications before printing patient's AVS!)      Current Outpatient Medications:    Calcium-Magnesium-Vitamin D (CALCIUM MAGNESIUM PO), Take by mouth 2 (two) times a day, Disp: , Rfl:     Cholecalciferol (VITAMIN D PO), Take 5,000 Units by mouth daily after lunch , Disp: , Rfl:     hydroxychloroquine (PLAQUENIL) 200 mg tablet, Take 200 mg by mouth 2 (two) times a day with meals, Disp: , Rfl:     losartan-hydrochlorothiazide (HYZAAR) 100-25 MG per tablet, daily at bedtime , Disp: , Rfl:     Multiple Vitamin (MULTIVITAMIN) tablet, Take 1 tablet by mouth daily, Disp: , Rfl:     Omega-3 Fatty Acids (fish oil) 1,000 mg, Take 1,000 mg by mouth daily, Disp: , Rfl:     traMADol (ULTRAM) 50 mg tablet, Take 50 mg by mouth every 6 (six) hours as needed for moderate pain (Patient not taking: Reported on 6/22/2022), Disp: , Rfl:       Review of Systems:  Have you recently had or currently have any of the following? If YES, what are you doing for the problem? · Fever, chills or unintended weight loss: No  · Sudden loss or change in your vision: No  · Nausea, vomiting or blood in your stool: No  · Painful or swollen joints: No  · Wheezing or cough: No  · Changing mole or non-healing wound: No  · Nosebleeds: No  · Excessive sweating: No  · Easy or prolonged bleeding? No  · Over the last 2 weeks, how often have you been bothered by the following problems? · Taking little interest or pleasure in doing things: 1 - Not at All  · Feeling down, depressed, or hopeless: 1 - Not at All  · Rapid heartbeat with epinephrine:  No    · FEMALES ONLY:    · Are you pregnant or planning to become pregnant? No  · Are you currently or planning to be nursing or breast feeding? No    · Any known allergies? Allergies   Allergen Reactions    Penicillins Other (See Comments)     Joint stiffness   ·       CONSTITUTIONAL:   There were no vitals filed for this visit          PSYCH: Normal mood and affect  EYES: Normal conjunctiva  ENT: Normal lips and oral mucosa  CARDIOVASCULAR: No edema  RESPIRATORY: Normal respirations  HEME/LYMPH/IMMUNO:  No regional lymphadenopathy except as noted below in "ASSESSMENT AND PLAN BY DIAGNOSIS"    SKIN:  FULL ORGAN SYSTEM EXAM   Chest/Breasts/Axillae Viewed areas Normal except as noted below in Assessment        Assessment and Plan by Diagnosis:    History of Present Condition:     Duration:  How long has this been an issue for you?    o  1 month    Location Affected:  Where on the body is this affecting you?    o  right chest wall    Quality:  Is there any bleeding, pain, itch, burning/irritation, or redness associated with the skin lesion? o  no   Severity:  Describe any bleeding, pain, itch, burning/irritation, or redness on a scale of 1 to 10 (with 10 being the worst)  o  n/a   Timing:  Does this condition seem to be there pretty constantly or do you notice it more at specific times throughout the day? o  constant    Context:  Have you ever noticed that this condition seems to be associated with specific activities you do?    o  no   Modifying Factors:    o Anything that seems to make the condition worse?    -  no   o What have you tried to do to make the condition better?    -  cephalexin 500 mg every 8 hours for 7 days    Associated Signs and Symptoms:  Does this skin lesion seem to be associated with any of the following:  o  SL AMB DERM SIGNS AND SYMPTOMS: Redness     CARBUNCLE   Physical Exam:   Anatomic Location Affected:  Right chest wall    Morphological Description:  Slightly atrophic tan patch    Pertinent Positives:   Pertinent Negatives: Additional History of Present Condition: wound culture done on 06 22 2022 incorrect result reported      Assessment and Plan:  Based on a thorough discussion of this condition and the management approach to it (including a comprehensive discussion of the known risks, side effects and potential benefits of treatment), the patient (family) agrees to implement the following specific plan:   No future treatment      If the lesion comes back wound culture will be performed      Scribe Attestation    I,:  Monica Lomas am acting as a scribe while in the presence of the attending physician :       I,:  Maile Le MD personally performed the services described in this documentation    as scribed in my presence :          

## 2022-12-08 ENCOUNTER — OFFICE VISIT (OUTPATIENT)
Dept: DERMATOLOGY | Age: 62
End: 2022-12-08

## 2022-12-08 VITALS — BODY MASS INDEX: 25.6 KG/M2 | TEMPERATURE: 97.2 F | HEIGHT: 61 IN | WEIGHT: 135.6 LBS

## 2022-12-08 DIAGNOSIS — L72.0 EPIDERMAL INCLUSION CYST: Primary | ICD-10-CM

## 2022-12-08 RX ORDER — TRIAMCINOLONE ACETONIDE 40 MG/ML
40 INJECTION, SUSPENSION INTRA-ARTICULAR; INTRAMUSCULAR ONCE
Status: COMPLETED | OUTPATIENT
Start: 2022-12-08 | End: 2022-12-08

## 2022-12-08 RX ADMIN — TRIAMCINOLONE ACETONIDE 40 MG: 40 INJECTION, SUSPENSION INTRA-ARTICULAR; INTRAMUSCULAR at 08:20

## 2022-12-08 NOTE — PROGRESS NOTES
[place] [address] [facility] Note     Patient Name: Tamika Cevallos  Encounter Date: 12/8/22     Have you been cared for by a [place] [address] Dermatologist in the last 3 years and, if so, which description applies to you? Yes  I have been here within the last 3 years, and my medical history has NOT changed since that time  I am FEMALE/of child-bearing potential     REVIEW OF SYSTEMS:  Have you recently had or currently have any of the following? · No changes in my recent health  PAST MEDICAL HISTORY:  Have you personally ever had or currently have any of the following? If "YES," then please provide more detail  · No changes in my medical history  FAMILY HISTORY:  Any "first degree relatives" (parent, brother, sister, or child) with the following? • No changes in my family's known health  PATIENT EXPERIENCE:    • Do you want the Dermatologist to perform a COMPLETE skin exam today including a clinical examination under the "bra and underwear" areas? NO  • If necessary, do we have your permission to call and leave a detailed message on your Preferred Phone number that includes your specific medical information?   Yes      Allergies   Allergen Reactions   • Penicillins Other (See Comments)     Joint stiffness      Current Outpatient Medications:   •  Calcium-Magnesium-Vitamin D (CALCIUM MAGNESIUM PO), Take by mouth 2 (two) times a day, Disp: , Rfl:   •  Cholecalciferol (VITAMIN D PO), Take 5,000 Units by mouth daily after lunch , Disp: , Rfl:   •  losartan-hydrochlorothiazide (HYZAAR) 100-25 MG per tablet, daily at bedtime , Disp: , Rfl:   •  Multiple Vitamin (MULTIVITAMIN) tablet, Take 1 tablet by mouth daily, Disp: , Rfl:   •  Omega-3 Fatty Acids (fish oil) 1,000 mg, Take 1,000 mg by mouth daily, Disp: , Rfl:   •  hydroxychloroquine (PLAQUENIL) 200 mg tablet, Take 200 mg by mouth 2 (two) times a day with meals, Disp: , Rfl:   •  traMADol (ULTRAM) 50 mg tablet, Take 50 mg by mouth every 6 (six) hours as needed for moderate pain, Disp: , Rfl:           • Whom besides the patient is providing clinical information about today's encounter?   o NO ADDITIONAL HISTORIAN (patient alone provided history)  o Patient here for painful sore like symptom on the right chest  Patient stated when the symptoms flares they are painful last a couple of days then subside leaving a brown brooke  Physical Exam and Assessment/Plan by Diagnosis:    EPIDERMAL INCLUSION CYST    Physical Exam:  • Anatomic Location Affected:  Right breast and left shoulder  • Morphological Description:  1 cm round mobile subcutaneous nodules  • Pertinent Positives:  • Pertinent Negatives: Additional History of Present Condition:  Painful, comes and goes    Assessment and Plan:  Based on a thorough discussion of this condition and the management approach to it (including a comprehensive discussion of the known risks, side effects and potential benefits of treatment), the patient (family) agrees to implement the following specific plan:  • Treatment of kenalog 40 injected into the areas at today's visit  • If no improvements in about a month recommends removal     What are epidermal inclusion cysts? Epidermal inclusion cysts are the most common, benign cutaneous cysts  There are many different names for epidermal inclusion cysts, including epidermoid cyst, epidermal cyst, infundibular cyst, inclusion cyst, and keratin cyst  These cysts can occur anywhere on the body and typically present as nodules directly underneath the skin  There is often a visible pore or opening in the center  The cysts are freely moveable and can range from a few millimeters to several centimeters in diameter  The center of epidermoid cysts almost always contains keratin, which has a cheesy appearance, and not sebum  They also do not originate from sebaceous glands  Therefore, epidermal inclusion cysts are not the same as sebaceous cysts      Cysts may remain stable or progressively enlarge over time  There are no reliable predictive factors to tell if an epidermal inclusion cyst will enlarge, become inflamed, or remain quiescent  Infected cysts tend to become larger, turn red, and are more noticeable to the patient  There may be accompanying pain and discomfort  What causes epidermal inclusion cysts? Epidermal inclusion cysts often appear out of the blue and are not contagious  They are due to a proliferation of epidermal cells within the dermis and are more common in men than women  They occur more frequently in patients in their 20s to 45s  Epidermal inclusion cysts by themselves are usually not inherited, but they can be hereditary in rare syndromes such as Scott syndrome, nodular elastosis with cysts and comedones (Favre-Racouchot syndrome), and basal cell nevus syndrome (Gorlin syndrome)  Elderly patients with chronic sun-damaged skin areas have a higher likelihood of developing epidermoid cysts  They often occur in areas where hair follicles have been inflamed or repeatedly irritated are more frequent in patients with acne vulgaris  In the  period, they are called milia  Patients on BRAF inhibitors such as imiquimod and cyclosporine have a higher incidence of epidermoid cysts of the face  How do we diagnose an epidermal inclusion cyst?  Epidermoid inclusion cysts are often diagnosed by history and physical exam  There is usually no need for biopsy prior to removal   Radiographic and laboratory exams, such as ultrasound studies, are unnecessary and not typically ordered unless the practitioner suspects a genetic condition  What is the treatment for an epidermal inclusion cyst?  Inflamed, uninfected epidermal inclusion cysts rarely resolve spontaneously without therapy or surgical intervention  Treatment is not emergent unless desired by the patient  Definitive treatment is via surgical excision with walls intact  This method will prevent recurrence   This is best done when the cyst is not inflamed, to decrease the probability of rupture during surgery  - A local anesthetic will be injected around the cyst  - A small incision is made in the skin overlying the cyst, and contents are expressed  - The incision is repaired with sutures    Another option is to use a 4mm punch biopsy with cyst extraction through the defect  Incision and drainage is often needed if the cyst is infected or inflamed  If there is surrounding cellulitis, oral antibiotic therapy may be necessary  The common agents used target methicillin sensitive Staphylococcal aureus and methicillin resistant S aureus in areas of high prevalence  PROCEDURE:  INTRALESIONAL STEROID INJECTION (KENALOG INJECTION)    Purpose: Triamcinolone is a synthetic glucocorticoid corticosteroid that has marked anti-inflammatory action  It is prepared in sterile aqueous suspension suitable for injecting directly into a lesion on or immediately below the skin to treat a dermal inflammatory process  Indications: It is indicated for alopecia areata; inflammatory acne cysts; discoid lupus erythematosus; keloids and hypertrophic scars; inflammatory lesions of granuloma annulare, lichen planus, lichen simplex chronicus (neurodermatitis), psoriatic plaques, and other localized inflammatory skin conditions  Potential Side Effects: I understand that triamcinolone injection can potentially cause early and/or delayed adverse effects such as:   • Pain   • Impaired wound healing   • Increased hair growth   • Bleeding   • White or brown marks   • Steroid acne   • Infection   • Telangiectasia   • Skin thinning   • Cutaneous and subcutaneous lipoatrophy (most common) appearing as skin indentations or dimples around the injection sites a few weeks after treatment     PROCEDURE NOTE:  After verbal and written consent were obtained, the to-be-treated area was wiped and cleaned with rubbing alcohol 70%        Then, a total of 0 2 mL of Kenalog CONCENTRATION:  40 mg/mL   (Lot# [id number]; Expiration May 2024, NDC#: 7606-0484-44) was injected intralesionally into a total of 2 lesion/s on the following anatomic areas:  Right breast, left shoulder using a 1-mL syringe and a 30-gauge needle  There was less than 1 mL of blood loss and little to no discomfort  The area was bandaged with a Band-aid  The patient tolerated the procedure well and remained in the office for observation  With no signs of an adverse reaction, the patient was eventually discharged from clinic        Scribe Attestation    I,:  Mick Damon am acting as a scribe while in the presence of the attending physician :       I,:  Lori Rojas MD personally performed the services described in this documentation    as scribed in my presence :

## 2023-03-30 ENCOUNTER — OFFICE VISIT (OUTPATIENT)
Dept: DERMATOLOGY | Age: 63
End: 2023-03-30

## 2023-03-30 VITALS — WEIGHT: 137 LBS | BODY MASS INDEX: 25.86 KG/M2 | HEIGHT: 61 IN | TEMPERATURE: 98.4 F

## 2023-03-30 DIAGNOSIS — L72.0 EPIDERMAL INCLUSION CYST: Primary | ICD-10-CM

## 2023-03-30 RX ORDER — VALSARTAN AND HYDROCHLOROTHIAZIDE 160; 25 MG/1; MG/1
1 TABLET ORAL DAILY
COMMUNITY
Start: 2023-02-01

## 2023-03-30 NOTE — PATIENT INSTRUCTIONS
EPIDERMAL INCLUSION CYST    Physical Exam:  Anatomic Location Affected:  Right chest, left superior shoulder   Morphological Description:  1 cm round subcutaneous nodules with pore   Pertinent Positives:  Pertinent Negatives: Additional History of Present Condition:  No improvement with kenalog injection     Assessment and Plan:  Based on a thorough discussion of this condition and the management approach to it (including a comprehensive discussion of the known risks, side effects and potential benefits of treatment), the patient (family) agrees to implement the following specific plan:  Excision discussed and recommended  Reassured there will be a scar    Patient will call regarding coverage  Cpt codes given  66 [phone] [id number], 15279    What are epidermal inclusion cysts? Epidermal inclusion cysts are the most common, benign cutaneous cysts  There are many different names for epidermal inclusion cysts, including epidermoid cyst, epidermal cyst, infundibular cyst, inclusion cyst, and keratin cyst  These cysts can occur anywhere on the body and typically present as nodules directly underneath the skin  There is often a visible pore or opening in the center  The cysts are freely moveable and can range from a few millimeters to several centimeters in diameter  The center of epidermoid cysts almost always contains keratin, which has a cheesy appearance, and not sebum  They also do not originate from sebaceous glands  Therefore, epidermal inclusion cysts are not the same as sebaceous cysts  Cysts may remain stable or progressively enlarge over time  There are no reliable predictive factors to tell if an epidermal inclusion cyst will enlarge, become inflamed, or remain quiescent  Infected cysts tend to become larger, turn red, and are more noticeable to the patient  There may be accompanying pain and discomfort  What causes epidermal inclusion cysts?   Epidermal inclusion cysts often appear out of the blue and are not contagious  They are due to a proliferation of epidermal cells within the dermis and are more common in men than women  They occur more frequently in patients in their 20s to 45s  Epidermal inclusion cysts by themselves are usually not inherited, but they can be hereditary in rare syndromes such as Scott syndrome, nodular elastosis with cysts and comedones (Favre-Racouchot syndrome), and basal cell nevus syndrome (Gorlin syndrome)  Elderly patients with chronic sun-damaged skin areas have a higher likelihood of developing epidermoid cysts  They often occur in areas where hair follicles have been inflamed or repeatedly irritated are more frequent in patients with acne vulgaris  In the  period, they are called milia  Patients on BRAF inhibitors such as imiquimod and cyclosporine have a higher incidence of epidermoid cysts of the face  How do we diagnose an epidermal inclusion cyst?  Epidermoid inclusion cysts are often diagnosed by history and physical exam  There is usually no need for biopsy prior to removal   Radiographic and laboratory exams, such as ultrasound studies, are unnecessary and not typically ordered unless the practitioner suspects a genetic condition  What is the treatment for an epidermal inclusion cyst?  Inflamed, uninfected epidermal inclusion cysts rarely resolve spontaneously without therapy or surgical intervention  Treatment is not emergent unless desired by the patient  Definitive treatment is via surgical excision with walls intact  This method will prevent recurrence  This is best done when the cyst is not inflamed, to decrease the probability of rupture during surgery  A local anesthetic will be injected around the cyst  A small incision is made in the skin overlying the cyst, and contents are expressed  The incision is repaired with sutures    Another option is to use a 4mm punch biopsy with cyst extraction through the defect      Incision and drainage is often needed if the cyst is infected or inflamed  If there is surrounding cellulitis, oral antibiotic therapy may be necessary  The common agents used target methicillin sensitive Staphylococcal aureus and methicillin resistant S aureus in areas of high prevalence

## 2023-03-30 NOTE — PROGRESS NOTES
"[facility] Note     Patient Name: Danay Lozada  Encounter Date: 03/30/2023     Have you been cared for by a [place] [address] Dermatologist in the last 3 years and, if so, which description applies to you? Yes  I have been here within the last 3 years, and my medical history has NOT changed since that time  I am FEMALE/of child-bearing potential     REVIEW OF SYSTEMS:  Have you recently had or currently have any of the following? · No changes in my recent health  PAST MEDICAL HISTORY:  Have you personally ever had or currently have any of the following? If \"YES,\" then please provide more detail  · No changes in my medical history  FAMILY HISTORY:  Any \"first degree relatives\" (parent, brother, sister, or child) with the following? • No changes in my family's known health  PATIENT EXPERIENCE:    • Do you want the Dermatologist to perform a COMPLETE skin exam today including a clinical examination under the \"bra and underwear\" areas? NO  • If necessary, do we have your permission to call and leave a detailed message on your Preferred Phone number that includes your specific medical information?   Yes      Allergies   Allergen Reactions   • Penicillins Other (See Comments)     Joint stiffness      Current Outpatient Medications:   •  Calcium-Magnesium-Vitamin D (CALCIUM MAGNESIUM PO), Take by mouth 2 (two) times a day, Disp: , Rfl:   •  Multiple Vitamin (MULTIVITAMIN) tablet, Take 1 tablet by mouth daily, Disp: , Rfl:   •  Omega-3 Fatty Acids (fish oil) 1,000 mg, Take 1,000 mg by mouth daily, Disp: , Rfl:   •  valsartan-hydrochlorothiazide (DIOVAN-HCT) 160-25 MG per tablet, Take 1 tablet by mouth daily, Disp: , Rfl:   •  Cholecalciferol (VITAMIN D PO), Take 5,000 Units by mouth daily after lunch , Disp: , Rfl:   •  hydroxychloroquine (PLAQUENIL) 200 mg tablet, Take 200 mg by mouth 2 (two) times a day with meals, Disp: , Rfl:   •  losartan-hydrochlorothiazide (HYZAAR) 100-25 MG per tablet, " daily at bedtime  (Patient not taking: Reported on 3/30/2023), Disp: , Rfl:   •  traMADol (ULTRAM) 50 mg tablet, Take 50 mg by mouth every 6 (six) hours as needed for moderate pain, Disp: , Rfl:           • Whom besides the patient is providing clinical information about today's encounter?   o NO ADDITIONAL HISTORIAN (patient alone provided history)    Physical Exam and Assessment/Plan by Diagnosis:      EPIDERMAL INCLUSION CYST    Physical Exam:  • Anatomic Location Affected:  Right chest, left superior shoulder   • Morphological Description:  1 cm round subcutaneous nodules with pore   • Pertinent Positives:  • Pertinent Negatives: Additional History of Present Condition:  No improvement with kenalog injection     Assessment and Plan:  Based on a thorough discussion of this condition and the management approach to it (including a comprehensive discussion of the known risks, side effects and potential benefits of treatment), the patient (family) agrees to implement the following specific plan:  • Excision discussed and recommended (cpt codes given 66 [id number], [id number])  • Reassured there will be a scar    • Advised since they are small both can be done same day   • Appointment scheduled     What are epidermal inclusion cysts? Epidermal inclusion cysts are the most common, benign cutaneous cysts  There are many different names for epidermal inclusion cysts, including epidermoid cyst, epidermal cyst, infundibular cyst, inclusion cyst, and keratin cyst  These cysts can occur anywhere on the body and typically present as nodules directly underneath the skin  There is often a visible pore or opening in the center  The cysts are freely moveable and can range from a few millimeters to several centimeters in diameter  The center of epidermoid cysts almost always contains keratin, which has a cheesy appearance, and not sebum  They also do not originate from sebaceous glands   Therefore, epidermal inclusion cysts are not the same as sebaceous cysts  Cysts may remain stable or progressively enlarge over time  There are no reliable predictive factors to tell if an epidermal inclusion cyst will enlarge, become inflamed, or remain quiescent  Infected cysts tend to become larger, turn red, and are more noticeable to the patient  There may be accompanying pain and discomfort  What causes epidermal inclusion cysts? Epidermal inclusion cysts often appear out of the blue and are not contagious  They are due to a proliferation of epidermal cells within the dermis and are more common in men than women  They occur more frequently in patients in their 20s to 45s  Epidermal inclusion cysts by themselves are usually not inherited, but they can be hereditary in rare syndromes such as Scott syndrome, nodular elastosis with cysts and comedones (Favre-Racouchot syndrome), and basal cell nevus syndrome (Gorlin syndrome)  Elderly patients with chronic sun-damaged skin areas have a higher likelihood of developing epidermoid cysts  They often occur in areas where hair follicles have been inflamed or repeatedly irritated are more frequent in patients with acne vulgaris  In the  period, they are called milia  Patients on BRAF inhibitors such as imiquimod and cyclosporine have a higher incidence of epidermoid cysts of the face  How do we diagnose an epidermal inclusion cyst?  Epidermoid inclusion cysts are often diagnosed by history and physical exam  There is usually no need for biopsy prior to removal   Radiographic and laboratory exams, such as ultrasound studies, are unnecessary and not typically ordered unless the practitioner suspects a genetic condition  What is the treatment for an epidermal inclusion cyst?  Inflamed, uninfected epidermal inclusion cysts rarely resolve spontaneously without therapy or surgical intervention  Treatment is not emergent unless desired by the patient       Definitive treatment is via surgical excision with walls intact  This method will prevent recurrence  This is best done when the cyst is not inflamed, to decrease the probability of rupture during surgery  - A local anesthetic will be injected around the cyst  - A small incision is made in the skin overlying the cyst, and contents are expressed  - The incision is repaired with sutures    Another option is to use a 4mm punch biopsy with cyst extraction through the defect  Incision and drainage is often needed if the cyst is infected or inflamed  If there is surrounding cellulitis, oral antibiotic therapy may be necessary  The common agents used target methicillin sensitive Staphylococcal aureus and methicillin resistant S aureus in areas of high prevalence         Scribe Attestation    I,:  Vivian Ward am acting as a scribe while in the presence of the attending physician :       I,:  Corinne Cortes MD personally performed the services described in this documentation    as scribed in my presence :

## 2023-04-26 ENCOUNTER — TELEPHONE (OUTPATIENT)
Dept: DERMATOLOGY | Age: 63
End: 2023-04-26

## 2023-08-01 ENCOUNTER — NEW PATIENT (OUTPATIENT)
Dept: URBAN - METROPOLITAN AREA CLINIC 51 | Facility: CLINIC | Age: 63
End: 2023-08-01

## 2023-08-01 DIAGNOSIS — Z79.899: ICD-10-CM

## 2023-08-01 DIAGNOSIS — H43.393: ICD-10-CM

## 2023-08-01 PROCEDURE — 92134 CPTRZ OPH DX IMG PST SGM RTA: CPT | Mod: NC

## 2023-08-01 PROCEDURE — 92202 OPSCPY EXTND ON/MAC DRAW: CPT | Mod: NC

## 2023-08-01 PROCEDURE — 92250 FUNDUS PHOTOGRAPHY W/I&R: CPT

## 2023-08-01 PROCEDURE — 99204 OFFICE O/P NEW MOD 45 MIN: CPT

## 2023-08-01 ASSESSMENT — VISUAL ACUITY
OD_CC: 20/20-2
OD_SC: 20/16-1

## 2023-08-01 ASSESSMENT — TONOMETRY
OD_IOP_MMHG: 13
OS_IOP_MMHG: 12